# Patient Record
Sex: FEMALE | Race: OTHER | Employment: UNEMPLOYED | URBAN - METROPOLITAN AREA
[De-identification: names, ages, dates, MRNs, and addresses within clinical notes are randomized per-mention and may not be internally consistent; named-entity substitution may affect disease eponyms.]

---

## 2017-07-22 ENCOUNTER — APPOINTMENT (OUTPATIENT)
Dept: GENERAL RADIOLOGY | Age: 42
End: 2017-07-22
Payer: MEDICARE

## 2017-07-22 ENCOUNTER — HOSPITAL ENCOUNTER (EMERGENCY)
Age: 42
Discharge: HOME | End: 2017-07-22
Attending: EMERGENCY MEDICINE
Payer: MEDICARE

## 2017-07-22 VITALS
HEART RATE: 84 BPM | HEIGHT: 62 IN | TEMPERATURE: 98.1 F | BODY MASS INDEX: 49.69 KG/M2 | SYSTOLIC BLOOD PRESSURE: 156 MMHG | WEIGHT: 270 LBS | RESPIRATION RATE: 18 BRPM | DIASTOLIC BLOOD PRESSURE: 78 MMHG | OXYGEN SATURATION: 98 %

## 2017-07-22 DIAGNOSIS — S93.411A SPRAIN OF CALCANEOFIBULAR LIGAMENT OF RIGHT ANKLE, INITIAL ENCOUNTER: Primary | ICD-10-CM

## 2017-07-22 PROCEDURE — 73610 X-RAY EXAM OF ANKLE: CPT

## 2017-07-22 PROCEDURE — 73630 X-RAY EXAM OF FOOT: CPT

## 2017-07-22 PROCEDURE — 99283 EMERGENCY DEPT VISIT LOW MDM: CPT

## 2017-07-22 PROCEDURE — 6370000000 HC RX 637 (ALT 250 FOR IP): Performed by: EMERGENCY MEDICINE

## 2017-07-22 RX ORDER — HYDROCODONE BITARTRATE AND ACETAMINOPHEN 5; 325 MG/1; MG/1
1 TABLET ORAL EVERY 6 HOURS PRN
Qty: 12 TABLET | Refills: 0 | Status: SHIPPED | OUTPATIENT
Start: 2017-07-22

## 2017-07-22 RX ORDER — IBUPROFEN 800 MG/1
800 TABLET ORAL EVERY 8 HOURS PRN
Qty: 30 TABLET | Refills: 0 | Status: SHIPPED | OUTPATIENT
Start: 2017-07-22

## 2017-07-22 RX ORDER — IBUPROFEN 400 MG/1
800 TABLET ORAL ONCE
Status: COMPLETED | OUTPATIENT
Start: 2017-07-22 | End: 2017-07-22

## 2017-07-22 RX ORDER — HYDROCODONE BITARTRATE AND ACETAMINOPHEN 5; 325 MG/1; MG/1
1 TABLET ORAL ONCE
Status: COMPLETED | OUTPATIENT
Start: 2017-07-22 | End: 2017-07-22

## 2017-07-22 RX ORDER — LEVOTHYROXINE SODIUM 0.05 MG/1
50 TABLET ORAL DAILY
COMMUNITY

## 2017-07-22 RX ORDER — HYDROXYCHLOROQUINE SULFATE 200 MG/1
200 TABLET, FILM COATED ORAL 2 TIMES DAILY
COMMUNITY

## 2017-07-22 RX ADMIN — IBUPROFEN 800 MG: 400 TABLET ORAL at 17:26

## 2017-07-22 RX ADMIN — HYDROCODONE BITARTRATE AND ACETAMINOPHEN 1 TABLET: 5; 325 TABLET ORAL at 17:26

## 2017-07-22 ASSESSMENT — ENCOUNTER SYMPTOMS
COUGH: 0
VOMITING: 0
STRIDOR: 0
EYE DISCHARGE: 0
CHOKING: 0
TROUBLE SWALLOWING: 0
VOICE CHANGE: 0
DIARRHEA: 0
BLOOD IN STOOL: 0
EYE REDNESS: 0
SINUS PRESSURE: 0
SHORTNESS OF BREATH: 0
WHEEZING: 0
FACIAL SWELLING: 0
SORE THROAT: 0
ABDOMINAL PAIN: 0
CHEST TIGHTNESS: 0
EYE PAIN: 0
CONSTIPATION: 0
BACK PAIN: 0

## 2017-07-22 ASSESSMENT — PAIN DESCRIPTION - DESCRIPTORS: DESCRIPTORS: ACHING

## 2017-07-22 ASSESSMENT — PAIN SCALES - GENERAL
PAINLEVEL_OUTOF10: 6
PAINLEVEL_OUTOF10: 7
PAINLEVEL_OUTOF10: 6

## 2017-07-22 ASSESSMENT — PAIN DESCRIPTION - ORIENTATION: ORIENTATION: RIGHT

## 2017-07-22 ASSESSMENT — PAIN DESCRIPTION - PAIN TYPE: TYPE: ACUTE PAIN

## 2017-07-22 ASSESSMENT — PAIN DESCRIPTION - LOCATION: LOCATION: ANKLE

## 2021-12-23 ENCOUNTER — APPOINTMENT (OUTPATIENT)
Dept: GENERAL RADIOLOGY | Age: 46
End: 2021-12-23
Payer: COMMERCIAL

## 2021-12-23 ENCOUNTER — HOSPITAL ENCOUNTER (EMERGENCY)
Age: 46
Discharge: HOME | End: 2021-12-23
Attending: EMERGENCY MEDICINE
Payer: COMMERCIAL

## 2021-12-23 VITALS
WEIGHT: 250 LBS | HEIGHT: 62 IN | HEART RATE: 86 BPM | BODY MASS INDEX: 46.01 KG/M2 | OXYGEN SATURATION: 99 % | TEMPERATURE: 97.4 F | DIASTOLIC BLOOD PRESSURE: 85 MMHG | RESPIRATION RATE: 18 BRPM | SYSTOLIC BLOOD PRESSURE: 140 MMHG

## 2021-12-23 DIAGNOSIS — Z01.84 COVID-19 VIRUS IGG ANTIBODY TEST RESULT UNKNOWN: Primary | ICD-10-CM

## 2021-12-23 LAB
ANION GAP SERPL CALCULATED.3IONS-SCNC: 12 MEQ/L (ref 9–15)
BASOPHILS ABSOLUTE: 0.1 K/UL (ref 0–0.1)
BASOPHILS RELATIVE PERCENT: 0.7 % (ref 0.1–1.2)
BUN BLDV-MCNC: 7 MG/DL (ref 6–20)
CALCIUM SERPL-MCNC: 9.2 MG/DL (ref 8.5–9.9)
CHLORIDE BLD-SCNC: 103 MEQ/L (ref 95–107)
CO2: 25 MEQ/L (ref 20–31)
CREAT SERPL-MCNC: 0.62 MG/DL (ref 0.5–0.9)
EKG ATRIAL RATE: 80 BPM
EKG P AXIS: 40 DEGREES
EKG P-R INTERVAL: 164 MS
EKG Q-T INTERVAL: 380 MS
EKG QRS DURATION: 92 MS
EKG QTC CALCULATION (BAZETT): 438 MS
EKG R AXIS: -13 DEGREES
EKG T AXIS: 36 DEGREES
EKG VENTRICULAR RATE: 80 BPM
EOSINOPHILS ABSOLUTE: 0.2 K/UL (ref 0–0.4)
EOSINOPHILS RELATIVE PERCENT: 2.8 % (ref 0.7–5.8)
GFR AFRICAN AMERICAN: >60
GFR NON-AFRICAN AMERICAN: >60
GLUCOSE BLD-MCNC: 104 MG/DL (ref 70–99)
HCT VFR BLD CALC: 36.6 % (ref 37–47)
HEMOGLOBIN: 12.1 G/DL (ref 11.2–15.7)
IMMATURE GRANULOCYTES #: 0 K/UL
IMMATURE GRANULOCYTES %: 0.2 %
LYMPHOCYTES ABSOLUTE: 2.2 K/UL (ref 1.2–3.7)
LYMPHOCYTES RELATIVE PERCENT: 26.7 %
MCH RBC QN AUTO: 29.2 PG (ref 25.6–32.2)
MCHC RBC AUTO-ENTMCNC: 33.1 % (ref 32.2–35.5)
MCV RBC AUTO: 88.2 FL (ref 79.4–94.8)
MONOCYTES ABSOLUTE: 0.6 K/UL (ref 0.2–0.9)
MONOCYTES RELATIVE PERCENT: 7.4 % (ref 4.7–12.5)
NEUTROPHILS ABSOLUTE: 5 K/UL (ref 1.6–6.1)
NEUTROPHILS RELATIVE PERCENT: 62.2 % (ref 34–71.1)
PDW BLD-RTO: 12.3 % (ref 11.7–14.4)
PLATELET # BLD: 310 K/UL (ref 182–369)
POTASSIUM SERPL-SCNC: 3.8 MEQ/L (ref 3.4–4.9)
RBC # BLD: 4.15 M/UL (ref 3.93–5.22)
SODIUM BLD-SCNC: 140 MEQ/L (ref 135–144)
WBC # BLD: 8.1 K/UL (ref 4–10)

## 2021-12-23 PROCEDURE — 80048 BASIC METABOLIC PNL TOTAL CA: CPT

## 2021-12-23 PROCEDURE — 93005 ELECTROCARDIOGRAM TRACING: CPT

## 2021-12-23 PROCEDURE — 93010 ELECTROCARDIOGRAM REPORT: CPT | Performed by: INTERNAL MEDICINE

## 2021-12-23 PROCEDURE — 99284 EMERGENCY DEPT VISIT MOD MDM: CPT

## 2021-12-23 PROCEDURE — 6370000000 HC RX 637 (ALT 250 FOR IP): Performed by: EMERGENCY MEDICINE

## 2021-12-23 PROCEDURE — 36415 COLL VENOUS BLD VENIPUNCTURE: CPT

## 2021-12-23 PROCEDURE — 85025 COMPLETE CBC W/AUTO DIFF WBC: CPT

## 2021-12-23 PROCEDURE — 71046 X-RAY EXAM CHEST 2 VIEWS: CPT

## 2021-12-23 RX ORDER — DIPHENHYDRAMINE HCL 25 MG
25 TABLET ORAL
Status: COMPLETED | OUTPATIENT
Start: 2021-12-23 | End: 2021-12-23

## 2021-12-23 RX ORDER — OXYCODONE HYDROCHLORIDE AND ACETAMINOPHEN 5; 325 MG/1; MG/1
1 TABLET ORAL ONCE
Status: COMPLETED | OUTPATIENT
Start: 2021-12-23 | End: 2021-12-23

## 2021-12-23 RX ORDER — ONDANSETRON 4 MG/1
4 TABLET, ORALLY DISINTEGRATING ORAL ONCE
Status: COMPLETED | OUTPATIENT
Start: 2021-12-23 | End: 2021-12-23

## 2021-12-23 RX ORDER — HYDRALAZINE HYDROCHLORIDE 10 MG/1
10 TABLET, FILM COATED ORAL EVERY 8 HOURS SCHEDULED
Status: DISCONTINUED | OUTPATIENT
Start: 2021-12-23 | End: 2021-12-23 | Stop reason: HOSPADM

## 2021-12-23 RX ORDER — ONDANSETRON 2 MG/ML
4 INJECTION INTRAMUSCULAR; INTRAVENOUS ONCE
Status: DISCONTINUED | OUTPATIENT
Start: 2021-12-23 | End: 2021-12-23

## 2021-12-23 RX ORDER — DIPHENHYDRAMINE HYDROCHLORIDE 50 MG/ML
50 INJECTION INTRAMUSCULAR; INTRAVENOUS ONCE
Status: DISCONTINUED | OUTPATIENT
Start: 2021-12-23 | End: 2021-12-23

## 2021-12-23 RX ORDER — 0.9 % SODIUM CHLORIDE 0.9 %
1000 INTRAVENOUS SOLUTION INTRAVENOUS ONCE
Status: DISCONTINUED | OUTPATIENT
Start: 2021-12-23 | End: 2021-12-23

## 2021-12-23 RX ORDER — HYDRALAZINE HYDROCHLORIDE 20 MG/ML
5 INJECTION INTRAMUSCULAR; INTRAVENOUS ONCE
Status: DISCONTINUED | OUTPATIENT
Start: 2021-12-23 | End: 2021-12-23

## 2021-12-23 RX ADMIN — OXYCODONE HYDROCHLORIDE AND ACETAMINOPHEN 1 TABLET: 5; 325 TABLET ORAL at 05:21

## 2021-12-23 RX ADMIN — DIPHENHYDRAMINE HCL 25 MG: 25 TABLET ORAL at 05:21

## 2021-12-23 RX ADMIN — HYDRALAZINE HYDROCHLORIDE 10 MG: 10 TABLET, FILM COATED ORAL at 05:21

## 2021-12-23 RX ADMIN — ONDANSETRON 4 MG: 4 TABLET, ORALLY DISINTEGRATING ORAL at 05:21

## 2021-12-23 ASSESSMENT — PAIN SCALES - GENERAL: PAINLEVEL_OUTOF10: 8

## 2021-12-23 ASSESSMENT — PAIN DESCRIPTION - LOCATION: LOCATION: HEAD

## 2021-12-23 NOTE — ED TRIAGE NOTES
Pt. Presents to ED with complaints of hypertension, headache, and left side chest pain that started yesterday. Home blood pressure machine reading 180/100's.

## 2021-12-23 NOTE — ED PROVIDER NOTES
[address] [place] [address] ED  EMERGENCY DEPARTMENT ENCOUNTER      Pt Name: Madonna Miranda  MRN: [id number]  [place] 1975  Date of evaluation: 12/23/2021  Provider: Patricia Atkinson DO        HISTORY OF PRESENT ILLNESS    Madonna Miranda is a 55 y.o. female per chart review has ah/o arthritis and hypothyroid. Presents with headache. The history is provided by the patient. URI  Presenting symptoms: congestion, cough and fatigue    Presenting symptoms: no ear pain, no fever and no sore throat    Severity:  Moderate  Onset quality:  Sudden  Timing:  Constant  Progression:  Unchanged  Chronicity:  New  Relieved by:  Nothing  Worsened by:  Nothing  Ineffective treatments:  None tried  Associated symptoms: myalgias    Associated symptoms: no neck pain    Risk factors: recent illness and sick contacts    Risk factors: no chronic cardiac disease, no chronic kidney disease and no chronic respiratory disease             REVIEW OF SYSTEMS       Review of Systems   Constitutional: Positive for fatigue. Negative for chills and fever. HENT: Positive for congestion. Negative for ear pain and sore throat. Eyes: Negative for discharge and redness. Respiratory: Positive for cough. Negative for shortness of breath. Cardiovascular: Negative for chest pain and palpitations. Gastrointestinal: Negative for abdominal pain, nausea and vomiting. Genitourinary: Negative for difficulty urinating and dysuria. Musculoskeletal: Positive for myalgias. Negative for back pain and neck pain. Skin: Negative for rash and wound. Neurological: Negative for dizziness and syncope. Psychiatric/Behavioral: Negative for confusion. The patient is not nervous/anxious. All other systems reviewed and are negative. Except as noted above the remainder of the review of systems was reviewed and negative.        PAST MEDICAL HISTORY     Past Medical History:   Diagnosis Date    Arthritis     Hypothyroid          SURGICAL HISTORY Past Surgical History:   Procedure Laterality Date    TUBAL LIGATION      WISDOM TOOTH EXTRACTION           CURRENT MEDICATIONS       Discharge Medication List as of 12/23/2021  6:49 AM      CONTINUE these medications which have NOT CHANGED    Details   levothyroxine (SYNTHROID) 50 MCG tablet Take 50 mcg by mouth DailyHistorical Med      hydroxychloroquine (PLAQUENIL) 200 MG tablet Take 200 mg by mouth 2 times dailyHistorical Med      HYDROcodone-acetaminophen (NORCO) 5-325 MG per tablet Take 1 tablet by mouth every 6 hours as needed for Pain ., Disp-12 tablet, R-0Print      ibuprofen (ADVIL;MOTRIN) 800 MG tablet Take 1 tablet by mouth every 8 hours as needed for Pain, Disp-30 tablet, R-0Print             ALLERGIES     Pcn [penicillins]    FAMILY HISTORY     History reviewed. No pertinent family history. SOCIAL HISTORY       Social History     Socioeconomic History    Marital status:      Spouse name: None    Number of children: None    Years of education: None    Highest education level: None   Occupational History    None   Tobacco Use    Smoking status: Never Smoker    Smokeless tobacco: Never Used   Substance and Sexual Activity    Alcohol use: No    Drug use: No    Sexual activity: None   Other Topics Concern    None   Social History Narrative    None     Social Determinants of Health     Financial Resource Strain:     Difficulty of Paying Living Expenses: Not on file   Food Insecurity:     Worried About Running Out of Food in the Last Year: Not on file    Luis of Food in the Last Year: Not on file   Transportation Needs:     Lack of Transportation (Medical): Not on file    Lack of Transportation (Non-Medical):  Not on file   Physical Activity:     Days of Exercise per Week: Not on file    Minutes of Exercise per Session: Not on file   Stress:     Feeling of Stress : Not on file   Social Connections:     Frequency of Communication with Friends and Family: Not on file warm.      Capillary Refill: Capillary refill takes less than 2 seconds. Neurological:      General: No focal deficit present. Mental Status: She is alert and oriented to person, place, and time. Deep Tendon Reflexes: Reflexes are normal and symmetric. Psychiatric:         Attention and Perception: Attention and perception normal.         Mood and Affect: Mood normal.         Behavior: Behavior normal. Behavior is cooperative. LABS:  Labs Reviewed   BASIC METABOLIC PANEL - Abnormal; Notable for the following components:       Result Value    Glucose 104 (*)     All other components within normal limits   CBC WITH AUTO DIFFERENTIAL - Abnormal; Notable for the following components:    Hematocrit 36.6 (*)     All other components within normal limits   URINE RT REFLEX TO CULTURE         MDM:   Vitals:    Vitals:    12/23/21 0407 12/23/21 0525 12/23/21 0615 12/23/21 0652   BP: (!) 180/117 (!) 151/108 (!) 131/93 (!) 140/85   Pulse: 88 84 86 86   Resp: 18 18 18 18   Temp: 97.4 °F (36.3 °C)      TempSrc: Temporal      SpO2: 100% 100% 99% 99%   Weight: 250 lb (113.4 kg)      Height: 5' 2\" (1.575 m)          MDM  Number of Diagnoses or Management Options  COVID-19 virus IgG antibody test result unknown  Diagnosis management comments: Patient presents with URI symptoms. Her headache was treated. She was given percocet 325/5 1 tab PO, zofran 4mg ODT, benadryl 25mg PO.  CXR: no acute changes  She feels much better and will be discharged home. She will follow up in 2 days with her primary care doctor. Blayne Hogan DO     The lab results, radiology and test results were reviewed with the patient and family. The patient will follow up in 2 days with their primary care doctor. If their symptoms change or get worse they will return to the ER. CRITICAL CARE TIME   Total CriticalCare time was 0 minutes, excluding separately reportable procedures.   There was a high probability of clinically significant/life threatening deterioration in the patient's condition which required my urgent intervention.         PROCEDURES:  Unlessotherwise noted below, none     Procedures      FINAL IMPRESSION      1. COVID-19 virus IgG antibody test result unknown          DISPOSITION/PLAN   DISPOSITION Decision To Discharge 12/23/2021 06:43:32 AM          RICH Pena DO (electronically signed)  Attending Emergency Physician          Neno Davison DO  12/26/21 3681

## 2021-12-23 NOTE — ED NOTES
Dr. Tasha Shaw notified of patient refusing IV because she doesn't think she needs it. Stated she is ok with the blood work but would like the medications oral.  Verbal orders placed per Dr. Tasha Shaw.        Jane Manning RN  12/23/21 3975

## 2021-12-26 ASSESSMENT — ENCOUNTER SYMPTOMS
SHORTNESS OF BREATH: 0
COUGH: 1
EYE DISCHARGE: 0
ABDOMINAL PAIN: 0
SORE THROAT: 0
BACK PAIN: 0
VOMITING: 0
EYE REDNESS: 0
NAUSEA: 0

## 2023-02-04 ENCOUNTER — HOSPITAL ENCOUNTER (EMERGENCY)
Age: 48
Discharge: HOME | End: 2023-02-04
Attending: EMERGENCY MEDICINE
Payer: COMMERCIAL

## 2023-02-04 VITALS
BODY MASS INDEX: 47.84 KG/M2 | DIASTOLIC BLOOD PRESSURE: 88 MMHG | SYSTOLIC BLOOD PRESSURE: 146 MMHG | HEIGHT: 62 IN | RESPIRATION RATE: 18 BRPM | WEIGHT: 260 LBS | TEMPERATURE: 98 F | OXYGEN SATURATION: 98 %

## 2023-02-04 DIAGNOSIS — L08.9 FINGER INFECTION: Primary | ICD-10-CM

## 2023-02-04 PROCEDURE — 99283 EMERGENCY DEPT VISIT LOW MDM: CPT

## 2023-02-04 PROCEDURE — 6370000000 HC RX 637 (ALT 250 FOR IP): Performed by: EMERGENCY MEDICINE

## 2023-02-04 RX ORDER — CLINDAMYCIN HYDROCHLORIDE 300 MG/1
300 CAPSULE ORAL 3 TIMES DAILY
Qty: 15 CAPSULE | Refills: 0 | Status: SHIPPED | OUTPATIENT
Start: 2023-02-04 | End: 2023-02-09

## 2023-02-04 RX ORDER — IBUPROFEN 400 MG/1
400 TABLET ORAL ONCE
Status: COMPLETED | OUTPATIENT
Start: 2023-02-04 | End: 2023-02-04

## 2023-02-04 RX ORDER — CLINDAMYCIN HYDROCHLORIDE 150 MG/1
300 CAPSULE ORAL ONCE
Status: COMPLETED | OUTPATIENT
Start: 2023-02-04 | End: 2023-02-04

## 2023-02-04 RX ADMIN — CLINDAMYCIN HYDROCHLORIDE 300 MG: 150 CAPSULE ORAL at 22:58

## 2023-02-04 RX ADMIN — IBUPROFEN 400 MG: 400 TABLET ORAL at 22:58

## 2023-02-04 ASSESSMENT — PAIN DESCRIPTION - ONSET
ONSET: ON-GOING
ONSET: ON-GOING

## 2023-02-04 ASSESSMENT — PAIN DESCRIPTION - DESCRIPTORS
DESCRIPTORS: ACHING
DESCRIPTORS: SHARP

## 2023-02-04 ASSESSMENT — PAIN - FUNCTIONAL ASSESSMENT
PAIN_FUNCTIONAL_ASSESSMENT: 0-10
PAIN_FUNCTIONAL_ASSESSMENT: 0-10

## 2023-02-04 ASSESSMENT — PAIN DESCRIPTION - PAIN TYPE
TYPE: ACUTE PAIN
TYPE: ACUTE PAIN

## 2023-02-04 ASSESSMENT — PAIN DESCRIPTION - FREQUENCY
FREQUENCY: CONTINUOUS
FREQUENCY: CONTINUOUS

## 2023-02-04 ASSESSMENT — PAIN DESCRIPTION - LOCATION
LOCATION: FINGER (COMMENT WHICH ONE)
LOCATION: FINGER (COMMENT WHICH ONE)

## 2023-02-04 ASSESSMENT — PAIN SCALES - GENERAL
PAINLEVEL_OUTOF10: 4
PAINLEVEL_OUTOF10: 5

## 2023-02-04 ASSESSMENT — PAIN DESCRIPTION - ORIENTATION: ORIENTATION: RIGHT

## 2023-02-05 NOTE — ED NOTES
Nursing Adult Assessment    General Appearance  [x] Facial Expressions, extremities, & body posture are relaxed. [] Exceptions:    Cognitive  [x] Alert, make eye contact when prompted. [x] Oriented to person, place, & situation. [] Exceptions:    Respiratory  [x] Unlabored breathing   [x] Speaks in clear and complete sentences   [x] Chest expansion is symmetrical with breaths   [x] Breath sounds are clear bilaterally. [] Exceptions:    Cardiovascular  [x] Regular apical heart sounds   [x] Peripheral pulses are palpable   [x] Capillary refill < 3 seconds in all extremities. [] Exceptions:    Abdomen  [x] Non-tender   [x] Non-distended   [x] Bowel sounds x4 quadrants.  [] Exceptions:    Skin  [x] Color appropriate for ethnicity   [x] No rash or discoloration present at the area(s) of complaint   [x] Warm and dry to touch. [x] Exceptions: Pt with swelling right thumb and no drainage. [x] Non-tender   [x] Normal range of motion   [x] Normal sensation   [x] Normal Appearance, No Edema.   [] Exceptions:     Gunnar Jensen RN  02/04/23 9477

## 2023-02-05 NOTE — ED PROVIDER NOTES
[facility] ED  EMERGENCY DEPARTMENT ENCOUNTER      Pt Name: Camden Harrell  MRN: [id number]  [place] 1975  Date of evaluation: 2/4/2023  Provider: Li Ramesh DO    CHIEF COMPLAINT       Chief Complaint   Patient presents with    Finger Pain     Right thumb     Chief complaint: Right thumb pain swelling    History of chief complaint: This 49-year-old female presents the emergency department complaining of noticing a red swollen area on her right thumb today and concern for infection. States she was picking some dead skin off of the base of her right thumb cuticle the other day and thinks infection may have gotten in. Patient denies any injury or trauma to the area. Patient states it is tender denies any numb tingling or weakness to the digit. Patient reports old nail trauma not new or different. She denies any fevers chills nausea vomiting weak or dizzy feelings she has otherwise been well is not a diabetic. Nursing Notes were reviewed. REVIEW OF SYSTEMS    (2-9 systems for level 4, 10 or more for level 5)     Review of Systems  Pertinent findings documented in the history of present illness  Except as noted above the remainder of the review of systems was reviewed and negative. PAST MEDICAL HISTORY     Past Medical History:   Diagnosis Date    Arthritis     Hypertension     Hypothyroid          SURGICAL HISTORY       Past Surgical History:   Procedure Laterality Date    TUBAL LIGATION      WISDOM TOOTH EXTRACTION           CURRENT MEDICATIONS       Previous Medications    HYDROCODONE-ACETAMINOPHEN (NORCO) 5-325 MG PER TABLET    Take 1 tablet by mouth every 6 hours as needed for Pain .     HYDROXYCHLOROQUINE (PLAQUENIL) 200 MG TABLET    Take 200 mg by mouth 2 times daily    IBUPROFEN (ADVIL;MOTRIN) 800 MG TABLET    Take 1 tablet by mouth every 8 hours as needed for Pain    LEVOTHYROXINE (SYNTHROID) 50 MCG TABLET    Take 50 mcg by mouth Daily       ALLERGIES     Pcn [penicillins]    FAMILY HISTORY     History reviewed. No pertinent family history. SOCIAL HISTORY       Social History     Socioeconomic History    Marital status:      Spouse name: None    Number of children: None    Years of education: None    Highest education level: None   Tobacco Use    Smoking status: Never    Smokeless tobacco: Never   Substance and Sexual Activity    Alcohol use: No    Drug use: No    Sexual activity: Yes     Partners: Male           PHYSICAL EXAM    (up to 7 for level 4, 8 or more for level 5)     ED Triage Vitals [02/04/23 2246]   BP Temp Temp Source Pulse Resp SpO2 Height Weight   (!) 146/88 98 °F (36.7 °C) Oral -- 18 98 % 5' 2\" (1.575 m) 260 lb (117.9 kg)       Physical Exam  General appearance: Patient is awake alert interactive appropriate nontoxic in no distress  Heart is regular rate and rhythm  Lungs are clear to auscultation with equal breaths bilaterally. Right thumb: There is a 1 cm x 8 cm oval area of focal swelling and dull erythema along the outer aspect of the distal thumb, there is no gross erythema or swelling around the nailbed or overlying the pad of the finger. No associated warmth. There is no lymphangitis. No fluctuance. Capillary refill is less than 2 seconds. There are chronic traumatic changes noted to the fingernail range of motion is full and intact to the interphalangeal and MP joint    DIAGNOSTIC RESULTS       EMERGENCY DEPARTMENT COURSE and DIFFERENTIAL DIAGNOSIS/MDM:   Vitals:    Vitals:    02/04/23 2246   BP: (!) 146/88   Resp: 18   Temp: 98 °F (36.7 °C)   TempSrc: Oral   SpO2: 98%   Weight: 260 lb (117.9 kg)   Height: 5' 2\" (1.575 m)     Treatment and course: Clindamycin 300 mg p.o. was initiated here along with Motrin 400 mg p.o. FINAL IMPRESSION      1.  Finger infection          DISPOSITION/PLAN   DISPOSITION Discharge - Pending Orders Complete 02/04/2023 11:01:03 PM  Patient discharged home advised warm moist compresses soaks to the thumb home-going prescription for clindamycin was written for 5 days. Patient advised to monitor closely return if any change or worsening increasing pain swelling redness numb tingling or weakness. Patient to follow-up with primary physician for repeat assessment in the next 2-3 days    PATIENT REFERRED TO:  DO Taryn Amato [address] [place] [address]  [phone]    In 2 days      DISCHARGE MEDICATIONS:  New Prescriptions    CLINDAMYCIN (CLEOCIN) 300 MG CAPSULE    Take 1 capsule by mouth 3 times daily for 5 days     Controlled Substances Monitoring:     No flowsheet data found.     (Please note that portions of this note were completed with a voice recognition program.  Efforts were made to edit the dictations but occasionally words are mis-transcribed.)    Olena Bowman DO (electronically signed)  Attending Emergency Physician          Olena Bowman DO  02/04/23 8603

## 2023-03-16 DIAGNOSIS — I10 HYPERTENSION, UNSPECIFIED TYPE: Primary | ICD-10-CM

## 2023-03-16 DIAGNOSIS — I10 PRIMARY HYPERTENSION: Primary | ICD-10-CM

## 2023-03-16 RX ORDER — HYDROCHLOROTHIAZIDE 12.5 MG/1
12.5 CAPSULE ORAL DAILY
Qty: 30 CAPSULE | Refills: 0 | Status: CANCELLED | OUTPATIENT
Start: 2023-03-16 | End: 2024-03-15

## 2023-03-16 RX ORDER — PANTOPRAZOLE SODIUM 40 MG/1
40 TABLET, DELAYED RELEASE ORAL
COMMUNITY
End: 2024-04-09 | Stop reason: ALTCHOICE

## 2023-03-16 RX ORDER — HYDROCHLOROTHIAZIDE 12.5 MG/1
12.5 CAPSULE ORAL DAILY
COMMUNITY
End: 2023-03-16 | Stop reason: SDUPTHER

## 2023-03-16 RX ORDER — AMLODIPINE BESYLATE 5 MG/1
5 TABLET ORAL DAILY
Qty: 30 TABLET | Refills: 5 | Status: SHIPPED | OUTPATIENT
Start: 2023-03-16 | End: 2023-10-26 | Stop reason: SDUPTHER

## 2023-03-16 RX ORDER — AMLODIPINE BESYLATE 5 MG/1
5 TABLET ORAL DAILY
COMMUNITY
End: 2023-03-16 | Stop reason: SDUPTHER

## 2023-03-16 RX ORDER — LEVOTHYROXINE SODIUM 50 UG/1
50 TABLET ORAL DAILY
COMMUNITY
End: 2023-12-15 | Stop reason: SDUPTHER

## 2023-03-16 RX ORDER — AMLODIPINE BESYLATE 5 MG/1
5 TABLET ORAL DAILY
Qty: 90 TABLET | Refills: 0 | Status: CANCELLED | OUTPATIENT
Start: 2023-03-16 | End: 2024-03-15

## 2023-03-16 RX ORDER — FLUTICASONE PROPIONATE 50 MCG
2 SPRAY, SUSPENSION (ML) NASAL DAILY
COMMUNITY
Start: 2022-05-11 | End: 2023-12-26 | Stop reason: WASHOUT

## 2023-03-16 RX ORDER — HYDROCHLOROTHIAZIDE 12.5 MG/1
12.5 CAPSULE ORAL DAILY
Qty: 30 CAPSULE | Refills: 5 | Status: SHIPPED | OUTPATIENT
Start: 2023-03-16 | End: 2023-12-28 | Stop reason: SDUPTHER

## 2023-03-27 NOTE — TELEPHONE ENCOUNTER
Called and left message to call back and schedule appointment  
Left DVM to call and schedule appt  
Left another message to call and schedule appointment. I will send a letter  
Detail Level: Detailed
Hide Additional Notes?: No
Detail Level: Simple

## 2023-05-23 ENCOUNTER — OFFICE VISIT (OUTPATIENT)
Dept: PRIMARY CARE | Facility: CLINIC | Age: 48
End: 2023-05-23
Payer: COMMERCIAL

## 2023-05-23 VITALS
HEIGHT: 62 IN | HEART RATE: 77 BPM | SYSTOLIC BLOOD PRESSURE: 136 MMHG | RESPIRATION RATE: 14 BRPM | OXYGEN SATURATION: 98 % | TEMPERATURE: 97.1 F | WEIGHT: 266 LBS | BODY MASS INDEX: 48.95 KG/M2 | DIASTOLIC BLOOD PRESSURE: 80 MMHG

## 2023-05-23 DIAGNOSIS — J45.20 MILD INTERMITTENT REACTIVE AIRWAY DISEASE WITHOUT COMPLICATION (HHS-HCC): ICD-10-CM

## 2023-05-23 DIAGNOSIS — J32.9 SINUSITIS, UNSPECIFIED CHRONICITY, UNSPECIFIED LOCATION: Primary | ICD-10-CM

## 2023-05-23 PROBLEM — R09.A2 GLOBUS SENSATION: Status: ACTIVE | Noted: 2023-05-23

## 2023-05-23 PROBLEM — R11.0 NAUSEA IN ADULT: Status: ACTIVE | Noted: 2023-05-23

## 2023-05-23 PROBLEM — N76.0 BACTERIAL VAGINOSIS: Status: ACTIVE | Noted: 2023-05-23

## 2023-05-23 PROBLEM — R05.9 COUGH: Status: ACTIVE | Noted: 2023-05-23

## 2023-05-23 PROBLEM — G47.30 SLEEP APNEA: Status: ACTIVE | Noted: 2023-05-23

## 2023-05-23 PROBLEM — B96.89 BACTERIAL VAGINOSIS: Status: ACTIVE | Noted: 2023-05-23

## 2023-05-23 PROBLEM — R10.84 GENERALIZED ABDOMINAL PAIN: Status: ACTIVE | Noted: 2023-05-23

## 2023-05-23 PROBLEM — L60.9 DISORDER OF NAIL: Status: ACTIVE | Noted: 2023-05-23

## 2023-05-23 PROBLEM — R00.2 PALPITATION: Status: ACTIVE | Noted: 2023-05-23

## 2023-05-23 PROBLEM — R10.9 LEFT FLANK PAIN: Status: ACTIVE | Noted: 2023-05-23

## 2023-05-23 PROBLEM — I10 HTN (HYPERTENSION): Status: ACTIVE | Noted: 2023-05-23

## 2023-05-23 PROBLEM — J30.2 SEASONAL ALLERGIES: Status: ACTIVE | Noted: 2023-05-23

## 2023-05-23 PROBLEM — D22.9 ATYPICAL MOLE: Status: ACTIVE | Noted: 2023-05-23

## 2023-05-23 PROBLEM — D64.9 ABSOLUTE ANEMIA: Status: ACTIVE | Noted: 2023-05-23

## 2023-05-23 PROBLEM — J06.9 URI (UPPER RESPIRATORY INFECTION): Status: ACTIVE | Noted: 2023-05-23

## 2023-05-23 PROBLEM — G43.909 MIGRAINE: Status: ACTIVE | Noted: 2023-05-23

## 2023-05-23 PROBLEM — M54.50 LOWER BACK PAIN: Status: ACTIVE | Noted: 2023-05-23

## 2023-05-23 PROBLEM — E06.3 HASHIMOTO'S THYROIDITIS: Status: ACTIVE | Noted: 2023-05-23

## 2023-05-23 PROBLEM — J30.9 ALLERGIC SINUSITIS: Status: ACTIVE | Noted: 2023-05-23

## 2023-05-23 PROBLEM — R31.9 HEMATURIA: Status: ACTIVE | Noted: 2023-05-23

## 2023-05-23 PROBLEM — K21.9 GASTROESOPHAGEAL REFLUX DISEASE: Status: ACTIVE | Noted: 2023-05-23

## 2023-05-23 PROBLEM — E03.9 HYPOTHYROID: Status: ACTIVE | Noted: 2023-05-23

## 2023-05-23 PROBLEM — M19.90 INFLAMMATORY ARTHROPATHY: Status: ACTIVE | Noted: 2023-05-23

## 2023-05-23 PROBLEM — K92.1 HEMATOCHEZIA: Status: ACTIVE | Noted: 2023-05-23

## 2023-05-23 PROBLEM — R74.8 ELEVATED LIVER ENZYMES: Status: ACTIVE | Noted: 2023-05-23

## 2023-05-23 PROCEDURE — 1036F TOBACCO NON-USER: CPT | Performed by: INTERNAL MEDICINE

## 2023-05-23 PROCEDURE — 99213 OFFICE O/P EST LOW 20 MIN: CPT | Performed by: INTERNAL MEDICINE

## 2023-05-23 PROCEDURE — 3079F DIAST BP 80-89 MM HG: CPT | Performed by: INTERNAL MEDICINE

## 2023-05-23 PROCEDURE — 3075F SYST BP GE 130 - 139MM HG: CPT | Performed by: INTERNAL MEDICINE

## 2023-05-23 PROCEDURE — 3008F BODY MASS INDEX DOCD: CPT | Performed by: INTERNAL MEDICINE

## 2023-05-23 RX ORDER — ALBUTEROL SULFATE 90 UG/1
2 AEROSOL, METERED RESPIRATORY (INHALATION) EVERY 4 HOURS PRN
Qty: 8.5 G | Refills: 1 | Status: SHIPPED | OUTPATIENT
Start: 2023-05-23 | End: 2023-07-20 | Stop reason: SDUPTHER

## 2023-05-23 RX ORDER — DOXYCYCLINE 100 MG/1
100 CAPSULE ORAL 2 TIMES DAILY
Qty: 20 CAPSULE | Refills: 0 | Status: SHIPPED | OUTPATIENT
Start: 2023-05-23 | End: 2023-06-02

## 2023-05-23 NOTE — PROGRESS NOTES
"Subjective    Eli Rockwell is a 48 y.o. female who presents for Cough.  HPI    C/o cough x 1 month. Post nasal drainage.   She had a cold about a month ago and now she it gets better and then it gets worse.   She has dry cough. Spastic. Keeps her up at night  No fever.     Review of Systems   All other systems reviewed and are negative.        Objective     /80 (BP Location: Left arm, Patient Position: Sitting, BP Cuff Size: Adult)   Pulse 77   Temp 36.2 °C (97.1 °F) (Skin)   Resp 14   Ht 1.575 m (5' 2\")   Wt 121 kg (266 lb)   SpO2 98%   BMI 48.65 kg/m²    Physical Exam  Vitals reviewed.   Constitutional:       General: She is not in acute distress.     Appearance: Normal appearance.   HENT:      Right Ear: Tympanic membrane normal.      Left Ear: Tympanic membrane normal.      Mouth/Throat:      Comments: pnd  Cardiovascular:      Rate and Rhythm: Normal rate and regular rhythm.      Pulses: Normal pulses.      Heart sounds: Normal heart sounds.   Pulmonary:      Effort: Pulmonary effort is normal.      Breath sounds: Normal breath sounds.   Abdominal:      Tenderness: There is no abdominal tenderness.   Musculoskeletal:         General: No swelling.      Cervical back: Normal range of motion and neck supple.   Lymphadenopathy:      Cervical: No cervical adenopathy.   Skin:     General: Skin is warm and dry.   Neurological:      Mental Status: She is alert.       Health Maintenance Due   Topic Date Due    Yearly Adult Physical  Never done    Hepatitis B Vaccines (1 of 3 - 3-dose series) Never done    HIV Screening  Never done    Colorectal Cancer Screening  Never done    COVID-19 Vaccine (1) Never done    MMR Vaccines (1 of 1 - Standard series) Never done    Diabetes Screening  Never done    Cervical Cancer Screening  Never done    DTaP/Tdap/Td Vaccines (1 - Tdap) Never done    Mammogram  Never done          Assessment/Plan   Problem List Items Addressed This Visit    None  Visit Diagnoses       " Sinusitis, unspecified chronicity, unspecified location    -  Primary    Relevant Medications    doxycycline (Vibramycin) 100 mg capsule    Mild intermittent reactive airway disease without complication        Relevant Medications    albuterol (ProAir HFA) 90 mcg/actuation inhaler        Increase fluids and rest. Call if sx worse or not improving. Follow up as needed  Take abx with food.   Side effects of albuterol discussed

## 2023-05-23 NOTE — PATIENT INSTRUCTIONS
Call  with any problems or questions.   Increase fluids and rest. Call if sx worse or not improving. Follow up as needed

## 2023-07-20 DIAGNOSIS — J45.20 MILD INTERMITTENT REACTIVE AIRWAY DISEASE WITHOUT COMPLICATION (HHS-HCC): ICD-10-CM

## 2023-07-20 RX ORDER — ALBUTEROL SULFATE 90 UG/1
2 AEROSOL, METERED RESPIRATORY (INHALATION) EVERY 4 HOURS PRN
Qty: 8.5 G | Refills: 2 | Status: SHIPPED | OUTPATIENT
Start: 2023-07-20 | End: 2024-04-09 | Stop reason: ALTCHOICE

## 2023-08-17 ENCOUNTER — OFFICE VISIT (OUTPATIENT)
Dept: PRIMARY CARE | Facility: CLINIC | Age: 48
End: 2023-08-17
Payer: COMMERCIAL

## 2023-08-17 ENCOUNTER — LAB (OUTPATIENT)
Dept: LAB | Facility: LAB | Age: 48
End: 2023-08-17
Payer: COMMERCIAL

## 2023-08-17 VITALS
SYSTOLIC BLOOD PRESSURE: 138 MMHG | TEMPERATURE: 97.2 F | WEIGHT: 260 LBS | HEART RATE: 86 BPM | OXYGEN SATURATION: 98 % | RESPIRATION RATE: 14 BRPM | DIASTOLIC BLOOD PRESSURE: 80 MMHG | HEIGHT: 62 IN | BODY MASS INDEX: 47.84 KG/M2

## 2023-08-17 DIAGNOSIS — R53.83 OTHER FATIGUE: ICD-10-CM

## 2023-08-17 DIAGNOSIS — Z12.31 ENCOUNTER FOR SCREENING MAMMOGRAM FOR MALIGNANT NEOPLASM OF BREAST: Primary | ICD-10-CM

## 2023-08-17 DIAGNOSIS — D64.9 ANEMIA, UNSPECIFIED TYPE: ICD-10-CM

## 2023-08-17 DIAGNOSIS — R00.2 PALPITATION: ICD-10-CM

## 2023-08-17 DIAGNOSIS — R06.02 SOB (SHORTNESS OF BREATH): ICD-10-CM

## 2023-08-17 LAB
ERYTHROCYTE DISTRIBUTION WIDTH (RATIO) BY AUTOMATED COUNT: 16.5 % (ref 11.5–14.5)
ERYTHROCYTE MEAN CORPUSCULAR HEMOGLOBIN CONCENTRATION (G/DL) BY AUTOMATED: 30.4 G/DL (ref 32–36)
ERYTHROCYTE MEAN CORPUSCULAR VOLUME (FL) BY AUTOMATED COUNT: 80 FL (ref 80–100)
ERYTHROCYTES (10*6/UL) IN BLOOD BY AUTOMATED COUNT: 4.55 X10E12/L (ref 4–5.2)
HEMATOCRIT (%) IN BLOOD BY AUTOMATED COUNT: 36.2 % (ref 36–46)
HEMOGLOBIN (G/DL) IN BLOOD: 11 G/DL (ref 12–16)
LEUKOCYTES (10*3/UL) IN BLOOD BY AUTOMATED COUNT: 7.7 X10E9/L (ref 4.4–11.3)
PLATELETS (10*3/UL) IN BLOOD AUTOMATED COUNT: 342 X10E9/L (ref 150–450)

## 2023-08-17 PROCEDURE — 82728 ASSAY OF FERRITIN: CPT

## 2023-08-17 PROCEDURE — 36415 COLL VENOUS BLD VENIPUNCTURE: CPT

## 2023-08-17 PROCEDURE — 84443 ASSAY THYROID STIM HORMONE: CPT

## 2023-08-17 PROCEDURE — 93000 ELECTROCARDIOGRAM COMPLETE: CPT | Performed by: INTERNAL MEDICINE

## 2023-08-17 PROCEDURE — 82607 VITAMIN B-12: CPT

## 2023-08-17 PROCEDURE — 1036F TOBACCO NON-USER: CPT | Performed by: INTERNAL MEDICINE

## 2023-08-17 PROCEDURE — 3075F SYST BP GE 130 - 139MM HG: CPT | Performed by: INTERNAL MEDICINE

## 2023-08-17 PROCEDURE — 3008F BODY MASS INDEX DOCD: CPT | Performed by: INTERNAL MEDICINE

## 2023-08-17 PROCEDURE — 3079F DIAST BP 80-89 MM HG: CPT | Performed by: INTERNAL MEDICINE

## 2023-08-17 PROCEDURE — 83540 ASSAY OF IRON: CPT

## 2023-08-17 PROCEDURE — 85027 COMPLETE CBC AUTOMATED: CPT

## 2023-08-17 PROCEDURE — 99214 OFFICE O/P EST MOD 30 MIN: CPT | Performed by: INTERNAL MEDICINE

## 2023-08-17 PROCEDURE — 83550 IRON BINDING TEST: CPT

## 2023-08-17 NOTE — PROGRESS NOTES
"Subjective    Eli Rockwell is a 48 y.o. female who presents for Hypertension.  HPI  Reports not feeling right last night. Felt heart palpitations.   Has had headaches and some lightheadedness in the last few days.   Decreased appetite, fatigue.   Has been going on for a month.   She is not sleeping well at night. Had her sleep study done last spring but she never got the results.   She has reguar diet and her periods are regular.   She is due for mammogram  She had colonoscopy in 2019/ repeat in 2019  Since she had covid two years ago she gets sob with exertion.   Her mood is soso    Review of Systems   All other systems reviewed and are negative.        Objective     /80 (BP Location: Left arm, Patient Position: Lying, BP Cuff Size: Adult)   Pulse 86   Temp 36.2 °C (97.2 °F) (Skin)   Resp 14   Ht 1.575 m (5' 2\")   Wt 118 kg (260 lb)   SpO2 98%   BMI 47.55 kg/m²    Physical Exam  Vitals reviewed.   Constitutional:       General: She is not in acute distress.     Appearance: Normal appearance.   Neck:      Thyroid: No thyroid mass or thyromegaly.   Cardiovascular:      Rate and Rhythm: Normal rate and regular rhythm.      Pulses: Normal pulses.      Heart sounds: Normal heart sounds.   Pulmonary:      Effort: Pulmonary effort is normal.      Breath sounds: Normal breath sounds.   Abdominal:      Tenderness: There is no abdominal tenderness.   Musculoskeletal:         General: No swelling.      Cervical back: Normal range of motion and neck supple.   Skin:     General: Skin is warm and dry.   Neurological:      Mental Status: She is alert.       Health Maintenance Due   Topic Date Due    Yearly Adult Physical  Never done    Hepatitis B Vaccines (1 of 3 - 3-dose series) Never done    HIV Screening  Never done    Colorectal Cancer Screening  Never done    COVID-19 Vaccine (1) Never done    MMR Vaccines (1 of 1 - Standard series) Never done    Diabetes Screening  Never done    DTaP/Tdap/Td Vaccines (1 - " Tdap) Never done    Mammogram  Never done    Cervical Cancer Screening  03/10/2023    TSH Level  08/19/2023          Assessment/Plan   Problem List Items Addressed This Visit       Absolute anemia    Relevant Orders    Vitamin B12    Iron and TIBC    Ferritin    CBC    Palpitation    Relevant Orders    Referral to Cardiology    Follow Up In [facility] - PCP - Established     Other Visit Diagnoses       Encounter for screening mammogram for malignant neoplasm of breast    -  Primary    Relevant Orders    BI mammo bilateral screening tomosynthesis    ECG 12 lead (Completed)    Other fatigue        Relevant Orders    Thyroid Stimulating Hormone    SOB (shortness of breath)        Relevant Orders    Referral to Cardiology    Follow Up In [facility] - PCP - Established        Will obtain sleep study done last year. Reminded pt that if she does not hear from us we did not receive test.  Check labs.   EKG is nsr  Will refer to cardio for palpitaitons and sob on exertion  Call  with any problems or questions.   Follow up in one month  If she gets sob she can try her albuterol  May need pft if cardiac work up is neg

## 2023-08-18 ENCOUNTER — TELEPHONE (OUTPATIENT)
Dept: PRIMARY CARE | Facility: CLINIC | Age: 48
End: 2023-08-18
Payer: COMMERCIAL

## 2023-08-18 DIAGNOSIS — D64.9 ANEMIA, UNSPECIFIED TYPE: Primary | ICD-10-CM

## 2023-08-18 LAB
COBALAMIN (VITAMIN B12) (PG/ML) IN SER/PLAS: 404 PG/ML (ref 211–911)
FERRITIN (UG/LL) IN SER/PLAS: 24 UG/L (ref 8–150)
IRON (UG/DL) IN SER/PLAS: 41 UG/DL (ref 35–150)
IRON BINDING CAPACITY (UG/DL) IN SER/PLAS: 437 UG/DL (ref 240–445)
IRON SATURATION (%) IN SER/PLAS: 9 % (ref 25–45)
THYROTROPIN (MIU/L) IN SER/PLAS BY DETECTION LIMIT <= 0.05 MIU/L: 3.59 MIU/L (ref 0.44–3.98)

## 2023-08-18 NOTE — TELEPHONE ENCOUNTER
----- Message from Nitza Reynoso DO sent at 8/18/2023  1:16 PM EDT -----  Her blood count is mildly low. Her iron stores are low. She has up to date EGD/colon and  is still menstruating. Would have her take oral OTC iron once a day  and repeat cbc in a month

## 2023-08-18 NOTE — RESULT ENCOUNTER NOTE
Her blood count is mildly low. Her iron stores are low. She has up to date EGD/colon and  is still menstruating. Would have her take oral OTC iron once a day  and repeat cbc in a month

## 2023-08-28 DIAGNOSIS — R92.8 ABNORMAL MAMMOGRAM: Primary | ICD-10-CM

## 2023-08-28 NOTE — RESULT ENCOUNTER NOTE
There is an assymetry in the left breast that needs better pictures. I ordered left dx mamm with us please help her set  it up

## 2023-09-01 ENCOUNTER — TELEPHONE (OUTPATIENT)
Dept: PRIMARY CARE | Facility: CLINIC | Age: 48
End: 2023-09-01
Payer: COMMERCIAL

## 2023-09-01 NOTE — TELEPHONE ENCOUNTER
DO Felicitas Doe, JEWELL  Her diagnostic mamm  was negative.  Normal breast tissue. Return to yearly screening

## 2023-09-07 ENCOUNTER — TELEPHONE (OUTPATIENT)
Dept: PRIMARY CARE | Facility: CLINIC | Age: 48
End: 2023-09-07
Payer: COMMERCIAL

## 2023-09-07 DIAGNOSIS — G47.33 OBSTRUCTIVE SLEEP APNEA SYNDROME: ICD-10-CM

## 2023-09-07 NOTE — TELEPHONE ENCOUNTER
Nitza Reynoso, DO Felicitas Nation, CMA  She has moderate irving. Recommend cpap therapy  If she is willing to start will order

## 2023-09-08 NOTE — TELEPHONE ENCOUNTER
Sp with patient. She is agreeable to trying cpap. Cpap ordered and faxed to [facility].

## 2023-09-22 ENCOUNTER — APPOINTMENT (OUTPATIENT)
Dept: PRIMARY CARE | Facility: CLINIC | Age: 48
End: 2023-09-22
Payer: COMMERCIAL

## 2023-10-02 ENCOUNTER — APPOINTMENT (OUTPATIENT)
Dept: PRIMARY CARE | Facility: CLINIC | Age: 48
End: 2023-10-02
Payer: COMMERCIAL

## 2023-10-04 PROBLEM — I48.0 PAROXYSMAL ATRIAL FIBRILLATION (MULTI): Status: ACTIVE | Noted: 2023-10-04

## 2023-10-04 PROBLEM — I47.29 NONSUSTAINED VENTRICULAR TACHYCARDIA (MULTI): Status: ACTIVE | Noted: 2023-10-04

## 2023-10-06 ENCOUNTER — TELEPHONE (OUTPATIENT)
Dept: CARDIOLOGY | Facility: CLINIC | Age: 48
End: 2023-10-06
Payer: COMMERCIAL

## 2023-10-06 ENCOUNTER — TELEPHONE (OUTPATIENT)
Dept: CARDIOLOGY | Facility: CLINIC | Age: 48
End: 2023-10-06

## 2023-10-17 ENCOUNTER — APPOINTMENT (OUTPATIENT)
Dept: PRIMARY CARE | Facility: CLINIC | Age: 48
End: 2023-10-17
Payer: COMMERCIAL

## 2023-10-20 ENCOUNTER — APPOINTMENT (OUTPATIENT)
Dept: CARDIOLOGY | Facility: CLINIC | Age: 48
End: 2023-10-20
Payer: COMMERCIAL

## 2023-10-26 DIAGNOSIS — I10 PRIMARY HYPERTENSION: ICD-10-CM

## 2023-10-26 RX ORDER — AMLODIPINE BESYLATE 5 MG/1
5 TABLET ORAL DAILY
Qty: 90 TABLET | Refills: 3 | Status: SHIPPED | OUTPATIENT
Start: 2023-10-26 | End: 2024-04-09 | Stop reason: SDUPTHER

## 2023-11-03 ENCOUNTER — APPOINTMENT (OUTPATIENT)
Dept: CARDIOLOGY | Facility: CLINIC | Age: 48
End: 2023-11-03
Payer: COMMERCIAL

## 2023-11-10 ENCOUNTER — TELEPHONE (OUTPATIENT)
Dept: PRIMARY CARE | Facility: CLINIC | Age: 48
End: 2023-11-10
Payer: COMMERCIAL

## 2023-11-10 NOTE — TELEPHONE ENCOUNTER
Sp with patient.   Informed her that per [facility] her insurance requires a new sleep study in order to provide a CPAP    She will call insurance first before proceeding and call me back if she wants to have another, due to deductible.

## 2023-11-14 ENCOUNTER — TELEPHONE (OUTPATIENT)
Dept: PRIMARY CARE | Facility: CLINIC | Age: 48
End: 2023-11-14
Payer: COMMERCIAL

## 2023-11-20 ENCOUNTER — APPOINTMENT (OUTPATIENT)
Dept: CARDIOLOGY | Facility: CLINIC | Age: 48
End: 2023-11-20
Payer: COMMERCIAL

## 2023-12-08 ENCOUNTER — APPOINTMENT (OUTPATIENT)
Dept: CARDIOLOGY | Facility: CLINIC | Age: 48
End: 2023-12-08
Payer: COMMERCIAL

## 2023-12-15 DIAGNOSIS — E06.3 HASHIMOTO'S THYROIDITIS: ICD-10-CM

## 2023-12-15 RX ORDER — LEVOTHYROXINE SODIUM 50 UG/1
50 TABLET ORAL DAILY
Qty: 90 TABLET | Refills: 3 | Status: SHIPPED | OUTPATIENT
Start: 2023-12-15 | End: 2024-04-09 | Stop reason: SDUPTHER

## 2023-12-19 ENCOUNTER — LAB (OUTPATIENT)
Dept: LAB | Facility: LAB | Age: 48
End: 2023-12-19
Payer: COMMERCIAL

## 2023-12-19 DIAGNOSIS — R00.2 PALPITATIONS: Primary | ICD-10-CM

## 2023-12-19 DIAGNOSIS — D64.9 ANEMIA, UNSPECIFIED TYPE: ICD-10-CM

## 2023-12-19 LAB
ERYTHROCYTE [DISTWIDTH] IN BLOOD BY AUTOMATED COUNT: 15.4 % (ref 11.5–14.5)
HCT VFR BLD AUTO: 36.9 % (ref 36–46)
HGB BLD-MCNC: 11.6 G/DL (ref 12–16)
MCH RBC QN AUTO: 25.9 PG (ref 26–34)
MCHC RBC AUTO-ENTMCNC: 31.4 G/DL (ref 32–36)
MCV RBC AUTO: 82 FL (ref 80–100)
NRBC BLD-RTO: 0 /100 WBCS (ref 0–0)
PLATELET # BLD AUTO: 291 X10*3/UL (ref 150–450)
RBC # BLD AUTO: 4.48 X10*6/UL (ref 4–5.2)
WBC # BLD AUTO: 8.4 X10*3/UL (ref 4.4–11.3)

## 2023-12-19 PROCEDURE — 80048 BASIC METABOLIC PNL TOTAL CA: CPT

## 2023-12-19 PROCEDURE — 36415 COLL VENOUS BLD VENIPUNCTURE: CPT

## 2023-12-19 PROCEDURE — 85027 COMPLETE CBC AUTOMATED: CPT

## 2023-12-19 PROCEDURE — 83735 ASSAY OF MAGNESIUM: CPT

## 2023-12-20 ENCOUNTER — TELEPHONE (OUTPATIENT)
Dept: CARDIOLOGY | Facility: CLINIC | Age: 48
End: 2023-12-20
Payer: COMMERCIAL

## 2023-12-20 LAB
ANION GAP SERPL CALC-SCNC: 11 MMOL/L (ref 10–20)
BUN SERPL-MCNC: 8 MG/DL (ref 6–23)
CALCIUM SERPL-MCNC: 9.2 MG/DL (ref 8.6–10.3)
CHLORIDE SERPL-SCNC: 102 MMOL/L (ref 98–107)
CO2 SERPL-SCNC: 30 MMOL/L (ref 21–32)
CREAT SERPL-MCNC: 0.7 MG/DL (ref 0.5–1.05)
GFR SERPL CREATININE-BSD FRML MDRD: >90 ML/MIN/1.73M*2
GLUCOSE SERPL-MCNC: 86 MG/DL (ref 74–99)
MAGNESIUM SERPL-MCNC: 1.84 MG/DL (ref 1.6–2.4)
POTASSIUM SERPL-SCNC: 3.9 MMOL/L (ref 3.5–5.3)
SODIUM SERPL-SCNC: 139 MMOL/L (ref 136–145)

## 2023-12-20 NOTE — TELEPHONE ENCOUNTER
----- Message from Kelsey Haas MD sent at 12/20/2023  8:04 AM EST -----  Labs look fine from a cardiac standpoint.   ----- Message -----  From: Lab, Background User  Sent: 12/20/2023  12:01 AM EST  To: Kelsey Haas MD

## 2023-12-20 NOTE — RESULT ENCOUNTER NOTE
Her hgb is mildly low but better  Recommend she take an oral iron supplement otc at least once daily

## 2023-12-21 ENCOUNTER — TELEPHONE (OUTPATIENT)
Dept: PRIMARY CARE | Facility: CLINIC | Age: 48
End: 2023-12-21
Payer: COMMERCIAL

## 2023-12-21 NOTE — TELEPHONE ENCOUNTER
----- Message from Nitza Reynoso DO sent at 12/20/2023 10:56 AM EST -----  Her hgb is mildly low but better  Recommend she take an oral iron supplement otc at least once daily

## 2023-12-26 ENCOUNTER — OFFICE VISIT (OUTPATIENT)
Dept: CARDIOLOGY | Facility: CLINIC | Age: 48
End: 2023-12-26
Payer: COMMERCIAL

## 2023-12-26 VITALS
HEIGHT: 62 IN | WEIGHT: 270 LBS | BODY MASS INDEX: 49.69 KG/M2 | SYSTOLIC BLOOD PRESSURE: 132 MMHG | HEART RATE: 81 BPM | DIASTOLIC BLOOD PRESSURE: 90 MMHG | TEMPERATURE: 96.9 F

## 2023-12-26 DIAGNOSIS — U09.9 POST COVID-19 CONDITION, UNSPECIFIED: ICD-10-CM

## 2023-12-26 DIAGNOSIS — Z78.9 NEVER SMOKED ANY SUBSTANCE: ICD-10-CM

## 2023-12-26 DIAGNOSIS — R06.02 SHORTNESS OF BREATH: ICD-10-CM

## 2023-12-26 DIAGNOSIS — R06.02 SHORTNESS OF BREATH ON EXERTION: ICD-10-CM

## 2023-12-26 DIAGNOSIS — R00.2 PALPITATION: ICD-10-CM

## 2023-12-26 DIAGNOSIS — I10 PRIMARY HYPERTENSION: Primary | ICD-10-CM

## 2023-12-26 DIAGNOSIS — I48.0 PAROXYSMAL ATRIAL FIBRILLATION (MULTI): ICD-10-CM

## 2023-12-26 PROBLEM — J06.9 URI (UPPER RESPIRATORY INFECTION): Status: RESOLVED | Noted: 2023-05-23 | Resolved: 2023-12-26

## 2023-12-26 PROCEDURE — 3080F DIAST BP >= 90 MM HG: CPT | Performed by: INTERNAL MEDICINE

## 2023-12-26 PROCEDURE — 3075F SYST BP GE 130 - 139MM HG: CPT | Performed by: INTERNAL MEDICINE

## 2023-12-26 PROCEDURE — 99213 OFFICE O/P EST LOW 20 MIN: CPT | Performed by: INTERNAL MEDICINE

## 2023-12-26 PROCEDURE — 1036F TOBACCO NON-USER: CPT | Performed by: INTERNAL MEDICINE

## 2023-12-26 ASSESSMENT — PATIENT HEALTH QUESTIONNAIRE - PHQ9
1. LITTLE INTEREST OR PLEASURE IN DOING THINGS: NOT AT ALL
SUM OF ALL RESPONSES TO PHQ9 QUESTIONS 1 AND 2: 0
2. FEELING DOWN, DEPRESSED OR HOPELESS: NOT AT ALL

## 2023-12-26 NOTE — PATIENT INSTRUCTIONS
If you start having more palpitations - would consider the use of the Apple Watch, or Storefront to record your heart rate and rhythm while you are having symptoms. You can then bring several rhythm strips you obtain for us to review.

## 2023-12-26 NOTE — PROGRESS NOTES
Patient:  Eli Rockwell  YOB: 1975  MRN: [id number]       Chief Complaint/Active Symptoms:       Eli Rockwell is a 48 y.o. female who returns today for cardiac follow-up.    Palpitations have resolved. Shortness of breath is not an issue at this time - feels she is just not the same since COVID. Still having issues when singing. Not noticeable during activities unless she climbs stairs quickly. Has some ankle edema. Worse is sits for a long time.     No chest pain or discomfort.       Review of Systems    Objective:     Vitals:    12/26/23 1117   BP: 132/90   Pulse: 81   Temp: 36.1 °C (96.9 °F)       Vitals:    12/26/23 1117   Weight: 122 kg (270 lb)       Allergies:     Allergies   Allergen Reactions    Penicillins Hives and Itching    Adhesive Tape-Silicones Rash          Medications:     Current Outpatient Medications   Medication Instructions    albuterol (ProAir HFA) 90 mcg/actuation inhaler 2 puffs, inhalation, Every 4 hours PRN    amLODIPine (NORVASC) 5 mg, oral, Daily    hydroCHLOROthiazide (MICROZIDE) 12.5 mg, oral, Daily    levothyroxine (SYNTHROID, LEVOXYL) 50 mcg, oral, Daily    pantoprazole (PROTONIX) 40 mg, oral, Daily before breakfast, prn       Physical Examination:   GENERAL:  Well developed, well nourished, in no acute distress.  HEENT: NC AT, EOMI with anicteric sclera  NECK:  Supple, no JVD, no bruit.  CHEST:  Symmetric and nontender.  LUNGS:  Clear to auscultation bilaterally, normal respiratory effort.  HEART:  PMI is nonpalpable. RRR with normal S1 and S2, no S3, no mumur or rub. No carotid or abdominal bruits  ABDOMEN: Obese, soft, NT, ND without palpable organomegaly or bruits  EXTREMITIES:  Warm with good color, no clubbing or cyanosis.  There is no edema noted.  PERIPHERAL VASCULAR:  Pulses present and equally palpable; 2+ throughout.  MUSCULOSKELETAL: No significant joint deformity.   NEURO/PSYCH:  Alert and oriented times three with approppriate behavior and  "responses. Nonfocal motor examination with normal gait and ambulation      Lab:     CBC:   Lab Results   Component Value Date    WBC 8.4 12/19/2023    RBC 4.48 12/19/2023    HGB 11.6 (L) 12/19/2023    HCT 36.9 12/19/2023     12/19/2023        CMP:    Lab Results   Component Value Date     12/19/2023    K 3.9 12/19/2023     12/19/2023    CO2 30 12/19/2023    BUN 8 12/19/2023    CREATININE 0.70 12/19/2023    GLUCOSE 86 12/19/2023    CALCIUM 9.2 12/19/2023       Magnesium:    Lab Results   Component Value Date    MG 1.84 12/19/2023       Lipid Profile:    Lab Results   Component Value Date    TRIG 166 (H) 08/19/2022    HDL 38.0 (A) 08/19/2022       TSH:    Lab Results   Component Value Date    TSH 3.59 08/17/2023       BNP:   Lab Results   Component Value Date    BNP 19 04/27/2022        PT/INR:    Lab Results   Component Value Date    PROTIME 10.7 04/27/2022    INR 0.9 04/27/2022       HgBA1c:    No results found for: \"HGBA1C\"    BMP:  Lab Results   Component Value Date     12/19/2023     11/22/2022     08/19/2022     05/31/2022    K 3.9 12/19/2023    K 3.8 11/22/2022    K 3.9 08/19/2022    K 3.9 05/31/2022     12/19/2023     11/22/2022     08/19/2022     05/31/2022    CO2 30 12/19/2023    CO2 26 11/22/2022    CO2 26 08/19/2022    CO2 26 05/31/2022    BUN 8 12/19/2023    BUN 8 11/22/2022    BUN 11 08/19/2022    BUN 11 05/31/2022    CREATININE 0.70 12/19/2023    CREATININE 0.80 11/22/2022    CREATININE 0.78 08/19/2022    CREATININE 0.67 05/31/2022       Cardiac Enzymes:    Lab Results   Component Value Date    TROPHS <3 04/27/2022    TROPHS 3 04/27/2022       Hepatic Function Panel:    Lab Results   Component Value Date    ALKPHOS 61 11/22/2022    ALT 45 11/22/2022    AST 35 11/22/2022    PROT 7.0 11/22/2022    BILITOT 0.4 11/22/2022    BILIDIR 0.1 06/18/2019         Diagnostic Studies:     No echocardiogram results found for the past 12 months  Echo " "with normal systolic and diastolic function 9/2023  No nuclear medicine results found for the past 12 months    No valid procedures specified.    EKG:   No results found for: \"EKG\"  Monitor showed rare PAC and PVC, no atrial fibrillation, no VT, no SVT or pauses. No symptoms during monitor.   Radiology:     No orders to display         ASSESSMENT     Problem List Items Addressed This Visit       HTN (hypertension) - Primary    RESOLVED: Palpitation    Paroxysmal atrial fibrillation (CMS/HCC)    Shortness of breath on exertion    Never smoked any substance    Post covid-19 condition, unspecified    Relevant Orders    Referral to Pulmonology       PLAN     Palpitations with history of paroxysmal atrial fibrillation.  Patient's monitor earlier last year did not show any episodes of A-fib.  She has had no further palpitations and she did not have any symptoms when wearing her monitor.  Due to her reaction to the adhesive with the monitor we suggested she consider either an Apple Watch or other smart watch that can take an EKG or cardia mobile to record her heart rate and rhythm when she has the symptoms.  We can review any tracings when she returns for follow-up.  Shortness of breath on exertion.  Patient is concerned that she has developed some form of condition post-COVID.  She heard from her  that he developed an asthma-like condition after COVID she wonders if she has the same but I would like a referral to pulmonary.  We have given her this referral and will defer any additional imaging or testing to them.  Echo no cardiac cause for her shortness of breath3.  Hypertension.  Blood pressures are controlled on her current medical regimen.  She feels she has some lower extremity edema from the amlodipine.  She can consider with her primary care physician and ACE or ARB and hopefully go off of the amlodipine or decrease the dose.  Hypertension.  Blood pressures are controlled on her current medical regimen.  She " can discuss alternatives to amlodipine with her primary care physician such as an ACE or ARB.  Tobacco and weight status.  The patient is a lifelong non-smoker but morbidly obese with a BMI of 49.  We have encouraged heart healthy weight reduction diet.    In the absence of any new symptoms we will see her in follow-up in 9 months to a year sooner if she has any episodes of palpitations or recordings that she wishes to review events or change in her symptoms.

## 2023-12-28 DIAGNOSIS — I10 PRIMARY HYPERTENSION: ICD-10-CM

## 2023-12-28 RX ORDER — HYDROCHLOROTHIAZIDE 12.5 MG/1
12.5 CAPSULE ORAL DAILY
Qty: 90 CAPSULE | Refills: 1 | Status: SHIPPED | OUTPATIENT
Start: 2023-12-28 | End: 2024-04-09 | Stop reason: SDUPTHER

## 2024-01-03 ENCOUNTER — OFFICE VISIT (OUTPATIENT)
Dept: PRIMARY CARE | Facility: CLINIC | Age: 49
End: 2024-01-03
Payer: COMMERCIAL

## 2024-01-03 VITALS
HEART RATE: 78 BPM | BODY MASS INDEX: 50.05 KG/M2 | OXYGEN SATURATION: 97 % | TEMPERATURE: 97.2 F | SYSTOLIC BLOOD PRESSURE: 130 MMHG | DIASTOLIC BLOOD PRESSURE: 78 MMHG | WEIGHT: 272 LBS | RESPIRATION RATE: 14 BRPM | HEIGHT: 62 IN

## 2024-01-03 DIAGNOSIS — N92.6 IRREGULAR PERIODS: ICD-10-CM

## 2024-01-03 DIAGNOSIS — I10 PRIMARY HYPERTENSION: ICD-10-CM

## 2024-01-03 DIAGNOSIS — R06.02 SOB (SHORTNESS OF BREATH): ICD-10-CM

## 2024-01-03 DIAGNOSIS — Z00.00 WELLNESS EXAMINATION: Primary | ICD-10-CM

## 2024-01-03 DIAGNOSIS — J45.20 MILD INTERMITTENT REACTIVE AIRWAY DISEASE WITHOUT COMPLICATION (HHS-HCC): ICD-10-CM

## 2024-01-03 DIAGNOSIS — D64.9 ANEMIA, UNSPECIFIED TYPE: ICD-10-CM

## 2024-01-03 PROBLEM — I47.29 NONSUSTAINED VENTRICULAR TACHYCARDIA (MULTI): Status: RESOLVED | Noted: 2023-10-04 | Resolved: 2024-01-03

## 2024-01-03 PROBLEM — R09.A2 GLOBUS SENSATION: Status: RESOLVED | Noted: 2023-05-23 | Resolved: 2024-01-03

## 2024-01-03 PROBLEM — R05.9 COUGH: Status: RESOLVED | Noted: 2023-05-23 | Resolved: 2024-01-03

## 2024-01-03 PROBLEM — R10.9 LEFT FLANK PAIN: Status: RESOLVED | Noted: 2023-05-23 | Resolved: 2024-01-03

## 2024-01-03 PROBLEM — E06.3 HASHIMOTO'S THYROIDITIS: Status: RESOLVED | Noted: 2023-05-23 | Resolved: 2024-01-03

## 2024-01-03 PROBLEM — R11.0 NAUSEA IN ADULT: Status: RESOLVED | Noted: 2023-05-23 | Resolved: 2024-01-03

## 2024-01-03 PROBLEM — R10.84 GENERALIZED ABDOMINAL PAIN: Status: RESOLVED | Noted: 2023-05-23 | Resolved: 2024-01-03

## 2024-01-03 PROBLEM — R31.9 HEMATURIA: Status: RESOLVED | Noted: 2023-05-23 | Resolved: 2024-01-03

## 2024-01-03 PROBLEM — N76.0 BACTERIAL VAGINOSIS: Status: RESOLVED | Noted: 2023-05-23 | Resolved: 2024-01-03

## 2024-01-03 PROBLEM — I48.0 PAROXYSMAL ATRIAL FIBRILLATION (MULTI): Status: RESOLVED | Noted: 2023-10-04 | Resolved: 2024-01-03

## 2024-01-03 PROBLEM — D22.9 ATYPICAL MOLE: Status: RESOLVED | Noted: 2023-05-23 | Resolved: 2024-01-03

## 2024-01-03 PROBLEM — B96.89 BACTERIAL VAGINOSIS: Status: RESOLVED | Noted: 2023-05-23 | Resolved: 2024-01-03

## 2024-01-03 PROBLEM — G43.909 MIGRAINE: Status: RESOLVED | Noted: 2023-05-23 | Resolved: 2024-01-03

## 2024-01-03 PROBLEM — U09.9 POST COVID-19 CONDITION, UNSPECIFIED: Status: RESOLVED | Noted: 2023-12-26 | Resolved: 2024-01-03

## 2024-01-03 PROBLEM — M19.90 INFLAMMATORY ARTHROPATHY: Status: RESOLVED | Noted: 2023-05-23 | Resolved: 2024-01-03

## 2024-01-03 PROBLEM — K92.1 HEMATOCHEZIA: Status: RESOLVED | Noted: 2023-05-23 | Resolved: 2024-01-03

## 2024-01-03 PROBLEM — R74.8 ELEVATED LIVER ENZYMES: Status: RESOLVED | Noted: 2023-05-23 | Resolved: 2024-01-03

## 2024-01-03 PROCEDURE — 3078F DIAST BP <80 MM HG: CPT | Performed by: INTERNAL MEDICINE

## 2024-01-03 PROCEDURE — 99396 PREV VISIT EST AGE 40-64: CPT | Performed by: INTERNAL MEDICINE

## 2024-01-03 PROCEDURE — 3075F SYST BP GE 130 - 139MM HG: CPT | Performed by: INTERNAL MEDICINE

## 2024-01-03 PROCEDURE — 1036F TOBACCO NON-USER: CPT | Performed by: INTERNAL MEDICINE

## 2024-01-03 NOTE — PROGRESS NOTES
"Subjective    Eli Rockwell is a 48 y.o. female who presents for Annual Exam.  HPI  Mammogram 8/2023  Colonoscopy was 2019 and she is good for 10 years  PAP declined not due until 205  Declines vaccines  She has heavy periods 4-5 days heavy she passes  clots   She has been having  cramping and occasional spotting between periods.       Review of Systems   All other systems reviewed and are negative.        Objective     /78 (BP Location: Left arm, Patient Position: Sitting, BP Cuff Size: Large adult)   Pulse 78   Temp 36.2 °C (97.2 °F) (Skin)   Resp 14   Ht 1.575 m (5' 2\")   Wt 123 kg (272 lb)   SpO2 97%   BMI 49.75 kg/m²    Physical Exam  Constitutional:       Appearance: Normal appearance.   Cardiovascular:      Rate and Rhythm: Normal rate and regular rhythm.      Pulses: Normal pulses.   Pulmonary:      Effort: Pulmonary effort is normal.      Breath sounds: Normal breath sounds.   Chest:      Chest wall: No mass or tenderness.   Breasts:     Right: Normal.      Left: Normal.          Comments: Dense tissue left breast.    Abdominal:      General: Abdomen is flat.   Musculoskeletal:      Cervical back: Neck supple. No tenderness.   Lymphadenopathy:      Cervical: No cervical adenopathy.      Upper Body:      Right upper body: No supraclavicular or axillary adenopathy.      Left upper body: No supraclavicular or axillary adenopathy.   Neurological:      Mental Status: She is alert.   Psychiatric:         Attention and Perception: Attention and perception normal.         Mood and Affect: Mood and affect normal.       Health Maintenance Due   Topic Date Due    Yearly Adult Physical  Never done    Hepatitis B Vaccines (1 of 3 - 3-dose series) Never done    HIV Screening  Never done    Colorectal Cancer Screening  Never done    COVID-19 Vaccine (1) Never done    MMR Vaccines (1 of 1 - Standard series) Never done    Diabetes Screening  Never done    DTaP/Tdap/Td Vaccines (1 - Tdap) Never done    " Cervical Cancer Screening  03/10/2023    Influenza Vaccine (1) Never done          Assessment/Plan   Problem List Items Addressed This Visit       HTN (hypertension)    Mild intermittent reactive airway disease without complication     Other Visit Diagnoses       Wellness examination    -  Primary    Relevant Orders    CBC    Comprehensive Metabolic Panel    Lipid Panel    SOB (shortness of breath)        Irregular periods        Relevant Orders    Referral to Gynecology    Anemia, unspecified type        Relevant Orders    Iron and TIBC        Check labs. Refer to gyn for abnormal periods. Her pap is utd  Call  with any problems or questions.   She has had the density  in left breast. Diagnostic mamm shows fibroglandular tissue  Follow up in 6 months.

## 2024-04-09 ENCOUNTER — TELEPHONE (OUTPATIENT)
Dept: SCHEDULING | Age: 49
End: 2024-04-09

## 2024-04-09 ENCOUNTER — OFFICE VISIT (OUTPATIENT)
Dept: PRIMARY CARE | Facility: CLINIC | Age: 49
End: 2024-04-09
Payer: COMMERCIAL

## 2024-04-09 VITALS
OXYGEN SATURATION: 99 % | BODY MASS INDEX: 51.21 KG/M2 | RESPIRATION RATE: 20 BRPM | WEIGHT: 280 LBS | HEART RATE: 72 BPM | TEMPERATURE: 98 F | DIASTOLIC BLOOD PRESSURE: 88 MMHG | SYSTOLIC BLOOD PRESSURE: 134 MMHG

## 2024-04-09 DIAGNOSIS — R09.A2 GLOBUS SENSATION: ICD-10-CM

## 2024-04-09 DIAGNOSIS — I10 PRIMARY HYPERTENSION: ICD-10-CM

## 2024-04-09 DIAGNOSIS — K21.9 GASTROESOPHAGEAL REFLUX DISEASE, UNSPECIFIED WHETHER ESOPHAGITIS PRESENT: Primary | ICD-10-CM

## 2024-04-09 DIAGNOSIS — E06.3 HASHIMOTO'S THYROIDITIS: ICD-10-CM

## 2024-04-09 PROCEDURE — 3079F DIAST BP 80-89 MM HG: CPT | Performed by: INTERNAL MEDICINE

## 2024-04-09 PROCEDURE — 1036F TOBACCO NON-USER: CPT | Performed by: INTERNAL MEDICINE

## 2024-04-09 PROCEDURE — 99214 OFFICE O/P EST MOD 30 MIN: CPT | Performed by: INTERNAL MEDICINE

## 2024-04-09 PROCEDURE — 3075F SYST BP GE 130 - 139MM HG: CPT | Performed by: INTERNAL MEDICINE

## 2024-04-09 RX ORDER — LEVOTHYROXINE SODIUM 50 UG/1
50 TABLET ORAL DAILY
Qty: 90 TABLET | Refills: 3 | Status: SHIPPED | OUTPATIENT
Start: 2024-04-09 | End: 2024-05-10 | Stop reason: SDUPTHER

## 2024-04-09 RX ORDER — AMLODIPINE BESYLATE 5 MG/1
5 TABLET ORAL DAILY
Qty: 90 TABLET | Refills: 3 | Status: SHIPPED | OUTPATIENT
Start: 2024-04-09 | End: 2025-04-09

## 2024-04-09 RX ORDER — PANTOPRAZOLE SODIUM 20 MG/1
20 TABLET, DELAYED RELEASE ORAL
Qty: 90 TABLET | Refills: 3 | Status: SHIPPED | OUTPATIENT
Start: 2024-04-09 | End: 2025-04-09

## 2024-04-09 RX ORDER — HYDROCHLOROTHIAZIDE 12.5 MG/1
12.5 CAPSULE ORAL DAILY
Qty: 90 CAPSULE | Refills: 3 | Status: SHIPPED | OUTPATIENT
Start: 2024-04-09 | End: 2025-04-09

## 2024-04-09 RX ORDER — FAMOTIDINE 20 MG/1
20 TABLET, FILM COATED ORAL NIGHTLY PRN
Qty: 90 TABLET | Refills: 3 | Status: SHIPPED | OUTPATIENT
Start: 2024-04-09 | End: 2025-04-09

## 2024-04-09 ASSESSMENT — ENCOUNTER SYMPTOMS: NECK PAIN: 1

## 2024-04-09 NOTE — PROGRESS NOTES
Subjective   Patient ID: Eli Rockwell is a 48 y.o. female who presents for Neck Pain.    Neck Pain   This is a new problem. The current episode started 1 to 4 weeks ago (1 week). The problem occurs constantly. The pain is associated with nothing. The pain is present in the anterior neck. Stiffness is present At night.      She has a feeling of something in her throat.   She had egd/colonosocpy in  2019 and they were negative  Bx were negative.  She does not take the pantoprazole daily  She feels a lump in her throat.   She gets heart burn often. Take pantoprazole about twice a week.     Review of Systems   Musculoskeletal:  Positive for neck pain.   All other systems reviewed and are negative.      Objective   /88 (BP Location: Right arm, Patient Position: Standing, BP Cuff Size: Large adult)   Pulse 72   Temp 36.7 °C (98 °F) (Temporal)   Resp 20   Wt 127 kg (280 lb)   SpO2 99%   BMI 51.21 kg/m²     Physical Exam  Vitals reviewed.   Constitutional:       General: She is not in acute distress.     Appearance: Normal appearance.   Cardiovascular:      Rate and Rhythm: Normal rate and regular rhythm.      Pulses: Normal pulses.      Heart sounds: Normal heart sounds.   Pulmonary:      Effort: Pulmonary effort is normal.      Breath sounds: Normal breath sounds.   Abdominal:      Tenderness: There is no abdominal tenderness.   Musculoskeletal:         General: No swelling.   Skin:     General: Skin is warm and dry.   Neurological:      Mental Status: She is alert.         Assessment/Plan   Problem List Items Addressed This Visit             ICD-10-CM    Gastroesophageal reflux disease - Primary K21.9    Relevant Medications    pantoprazole (Protonix) 20 mg EC tablet    famotidine (Pepcid) 20 mg tablet    HTN (hypertension) I10    Relevant Medications    hydroCHLOROthiazide (Microzide) 12.5 mg capsule    amLODIPine (Norvasc) 5 mg tablet     Other Visit Diagnoses         Codes    Hashimoto's thyroiditis      E06.3    Relevant Medications    levothyroxine (Synthroid, Levoxyl) 50 mcg tablet    Globus sensation     R09.A2    Relevant Orders    US thyroid        Her sx are likely due to GERD. Will check us.   She does not want to take pantoprazole 40 daily so will decrease to 20 daily and add pepcid at hs prn  She had normal egd/colonoscopy in 2019  Recommend weight loss. Noom or other weight loss program   Call  with any problems or questions.   Follow up in 6-8 weeks if no better will refer to ent for visualization of cords

## 2024-04-10 ENCOUNTER — HOSPITAL ENCOUNTER (OUTPATIENT)
Dept: RADIOLOGY | Facility: CLINIC | Age: 49
Discharge: HOME | End: 2024-04-10
Payer: COMMERCIAL

## 2024-04-10 DIAGNOSIS — R09.A2 GLOBUS SENSATION: ICD-10-CM

## 2024-04-10 PROCEDURE — 76536 US EXAM OF HEAD AND NECK: CPT

## 2024-04-12 ENCOUNTER — TELEPHONE (OUTPATIENT)
Dept: PRIMARY CARE | Facility: CLINIC | Age: 49
End: 2024-04-12
Payer: COMMERCIAL

## 2024-04-12 NOTE — TELEPHONE ENCOUNTER
----- Message from Nitza Reynoso DO sent at 4/12/2024 10:57 AM EDT -----  Thyroid is small with benign appearing lymphnodes

## 2024-05-08 ENCOUNTER — OFFICE VISIT (OUTPATIENT)
Dept: PRIMARY CARE | Facility: CLINIC | Age: 49
End: 2024-05-08
Payer: COMMERCIAL

## 2024-05-08 VITALS
SYSTOLIC BLOOD PRESSURE: 138 MMHG | HEART RATE: 102 BPM | DIASTOLIC BLOOD PRESSURE: 88 MMHG | RESPIRATION RATE: 16 BRPM | TEMPERATURE: 97.5 F | BODY MASS INDEX: 49.38 KG/M2 | WEIGHT: 270 LBS | OXYGEN SATURATION: 95 %

## 2024-05-08 DIAGNOSIS — R00.2 PALPITATION: ICD-10-CM

## 2024-05-08 DIAGNOSIS — R09.A2 GLOBUS SENSATION: Primary | ICD-10-CM

## 2024-05-08 DIAGNOSIS — K21.9 GASTROESOPHAGEAL REFLUX DISEASE, UNSPECIFIED WHETHER ESOPHAGITIS PRESENT: ICD-10-CM

## 2024-05-08 DIAGNOSIS — E03.9 ACQUIRED HYPOTHYROIDISM: ICD-10-CM

## 2024-05-08 DIAGNOSIS — I10 PRIMARY HYPERTENSION: ICD-10-CM

## 2024-05-08 PROCEDURE — 99214 OFFICE O/P EST MOD 30 MIN: CPT | Performed by: INTERNAL MEDICINE

## 2024-05-08 PROCEDURE — 1036F TOBACCO NON-USER: CPT | Performed by: INTERNAL MEDICINE

## 2024-05-08 PROCEDURE — 3075F SYST BP GE 130 - 139MM HG: CPT | Performed by: INTERNAL MEDICINE

## 2024-05-08 PROCEDURE — 3079F DIAST BP 80-89 MM HG: CPT | Performed by: INTERNAL MEDICINE

## 2024-05-08 NOTE — PROGRESS NOTES
Subjective    Eli Rockwell is a 49 y.o. female who presents for Neck Pain.  HPI  Follow up neck pain.  Seen a month ago.   Reports worsening sx   Feeling as if something is stuck in her throat.   Does not take pantoprazole regularly   She takes it every other day.  She has taken pepcid once\  she is anxious she is not sleeping. She does not want anything even otc for sleep due to fear she will not tolerate  She has not gotten her labs done.     Review of Systems   All other systems reviewed and are negative.        Objective     /88 (BP Location: Left arm, Patient Position: Sitting, BP Cuff Size: Adult)   Pulse 102   Temp 36.4 °C (97.5 °F) (Skin)   Resp 16   Wt 122 kg (270 lb)   SpO2 95%   BMI 49.38 kg/m²    Physical Exam  Vitals reviewed.   Constitutional:       General: She is not in acute distress.     Appearance: Normal appearance.   HENT:      Mouth/Throat:      Mouth: Mucous membranes are moist.      Pharynx: Oropharynx is clear.   Neck:      Comments: No neck mass  Cardiovascular:      Rate and Rhythm: Normal rate and regular rhythm.      Pulses: Normal pulses.      Heart sounds: Normal heart sounds.   Pulmonary:      Effort: Pulmonary effort is normal.      Breath sounds: Normal breath sounds.   Abdominal:      Tenderness: There is no abdominal tenderness.   Musculoskeletal:         General: No swelling.      Cervical back: Neck supple. No tenderness.   Lymphadenopathy:      Cervical: No cervical adenopathy.   Skin:     General: Skin is warm and dry.   Neurological:      Mental Status: She is alert.       Health Maintenance Due   Topic Date Due    HIV Screening  Never done    MMR Vaccines (1 of 1 - Standard series) Never done    Pneumococcal Vaccine: Pediatrics (0 to 5 Years) and At-Risk Patients (6 to 64 Years) (1 of 2 - PCV) Never done    Diabetes Screening  Never done    Hepatitis B Vaccines (1 of 3 - 19+ 3-dose series) Never done    DTaP/Tdap/Td Vaccines (1 - Tdap) Never done    Cervical  Cancer Screening  03/10/2023    COVID-19 Vaccine (1 - 2023-24 season) Never done          Assessment/Plan   Problem List Items Addressed This Visit       Gastroesophageal reflux disease    HTN (hypertension)    Hypothyroid    Relevant Orders    TSH with reflex to Free T4 if abnormal     Other Visit Diagnoses       Globus sensation    -  Primary    Relevant Orders    Referral to ENT    Palpitation        Relevant Orders    Magnesium        Recommend she take pantoprazole daily  Refer to ent.   Check labs.  She is anxious  She does not want to take anything for sleep. Recommend otc magnesium glycinate  one tab a night 200mg  She has complaints of swelling of her ankles with her bp med  She can dc amlodipine and if her labs are ok will increase her water pill  Call  with any problems or questions.   Follow up in 4-6 weeks   No

## 2024-05-09 ENCOUNTER — LAB (OUTPATIENT)
Dept: LAB | Facility: LAB | Age: 49
End: 2024-05-09
Payer: COMMERCIAL

## 2024-05-09 DIAGNOSIS — D64.9 ANEMIA, UNSPECIFIED TYPE: ICD-10-CM

## 2024-05-09 DIAGNOSIS — E03.9 ACQUIRED HYPOTHYROIDISM: ICD-10-CM

## 2024-05-09 DIAGNOSIS — Z00.00 WELLNESS EXAMINATION: ICD-10-CM

## 2024-05-09 DIAGNOSIS — R00.2 PALPITATION: ICD-10-CM

## 2024-05-09 LAB
ALBUMIN SERPL BCP-MCNC: 4 G/DL (ref 3.4–5)
ALP SERPL-CCNC: 63 U/L (ref 33–110)
ALT SERPL W P-5'-P-CCNC: 66 U/L (ref 7–45)
ANION GAP SERPL CALC-SCNC: 12 MMOL/L (ref 10–20)
AST SERPL W P-5'-P-CCNC: 60 U/L (ref 9–39)
BILIRUB SERPL-MCNC: 0.5 MG/DL (ref 0–1.2)
BUN SERPL-MCNC: 9 MG/DL (ref 6–23)
CALCIUM SERPL-MCNC: 9.2 MG/DL (ref 8.6–10.3)
CHLORIDE SERPL-SCNC: 103 MMOL/L (ref 98–107)
CHOLEST SERPL-MCNC: 164 MG/DL (ref 0–199)
CHOLESTEROL/HDL RATIO: 5
CO2 SERPL-SCNC: 29 MMOL/L (ref 21–32)
CREAT SERPL-MCNC: 0.76 MG/DL (ref 0.5–1.05)
EGFRCR SERPLBLD CKD-EPI 2021: >90 ML/MIN/1.73M*2
ERYTHROCYTE [DISTWIDTH] IN BLOOD BY AUTOMATED COUNT: 15.3 % (ref 11.5–14.5)
GLUCOSE SERPL-MCNC: 87 MG/DL (ref 74–99)
HCT VFR BLD AUTO: 38.6 % (ref 36–46)
HDLC SERPL-MCNC: 32.7 MG/DL
HGB BLD-MCNC: 12.3 G/DL (ref 12–16)
IRON SATN MFR SERPL: 13 % (ref 25–45)
IRON SERPL-MCNC: 56 UG/DL (ref 35–150)
LDLC SERPL CALC-MCNC: 96 MG/DL
MAGNESIUM SERPL-MCNC: 2.08 MG/DL (ref 1.6–2.4)
MCH RBC QN AUTO: 26.9 PG (ref 26–34)
MCHC RBC AUTO-ENTMCNC: 31.9 G/DL (ref 32–36)
MCV RBC AUTO: 85 FL (ref 80–100)
NON HDL CHOLESTEROL: 131 MG/DL (ref 0–149)
NRBC BLD-RTO: 0 /100 WBCS (ref 0–0)
PLATELET # BLD AUTO: 325 X10*3/UL (ref 150–450)
POTASSIUM SERPL-SCNC: 3.8 MMOL/L (ref 3.5–5.3)
PROT SERPL-MCNC: 6.7 G/DL (ref 6.4–8.2)
RBC # BLD AUTO: 4.57 X10*6/UL (ref 4–5.2)
SODIUM SERPL-SCNC: 140 MMOL/L (ref 136–145)
T4 FREE SERPL-MCNC: 1.07 NG/DL (ref 0.61–1.12)
TIBC SERPL-MCNC: 422 UG/DL (ref 240–445)
TRIGL SERPL-MCNC: 178 MG/DL (ref 0–149)
TSH SERPL-ACNC: 5.03 MIU/L (ref 0.44–3.98)
UIBC SERPL-MCNC: 366 UG/DL (ref 110–370)
VLDL: 36 MG/DL (ref 0–40)
WBC # BLD AUTO: 7.1 X10*3/UL (ref 4.4–11.3)

## 2024-05-09 PROCEDURE — 80053 COMPREHEN METABOLIC PANEL: CPT

## 2024-05-09 PROCEDURE — 84443 ASSAY THYROID STIM HORMONE: CPT

## 2024-05-09 PROCEDURE — 84439 ASSAY OF FREE THYROXINE: CPT

## 2024-05-09 PROCEDURE — 83550 IRON BINDING TEST: CPT

## 2024-05-09 PROCEDURE — 36415 COLL VENOUS BLD VENIPUNCTURE: CPT

## 2024-05-09 PROCEDURE — 83540 ASSAY OF IRON: CPT

## 2024-05-09 PROCEDURE — 83735 ASSAY OF MAGNESIUM: CPT

## 2024-05-09 PROCEDURE — 85027 COMPLETE CBC AUTOMATED: CPT

## 2024-05-09 PROCEDURE — 80061 LIPID PANEL: CPT

## 2024-05-10 ENCOUNTER — TELEPHONE (OUTPATIENT)
Dept: PRIMARY CARE | Facility: CLINIC | Age: 49
End: 2024-05-10
Payer: COMMERCIAL

## 2024-05-10 DIAGNOSIS — E06.3 HASHIMOTO'S THYROIDITIS: ICD-10-CM

## 2024-05-10 DIAGNOSIS — R79.89 ELEVATED LFTS: Primary | ICD-10-CM

## 2024-05-10 RX ORDER — LEVOTHYROXINE SODIUM 75 UG/1
75 TABLET ORAL DAILY
Qty: 90 TABLET | Refills: 3 | Status: SHIPPED | OUTPATIENT
Start: 2024-05-10 | End: 2025-05-10

## 2024-05-10 NOTE — TELEPHONE ENCOUNTER
----- Message from Nitza Reynoso DO sent at 5/10/2024  1:39 PM EDT -----  Her thyroid is under treated. Increase her synthroid to 75 mcg daily. Recheck tsh in 6 weeks.   Her lft are elevated. Like due to nafld. Decrease sugars and will also recheck

## 2024-05-10 NOTE — RESULT ENCOUNTER NOTE
Her thyroid is under treated. Increase her synthroid to 75 mcg daily. Recheck tsh in 6 weeks.   Her lft are elevated. Like due to nafld. Decrease sugars and will also recheck

## 2024-05-16 ENCOUNTER — OFFICE VISIT (OUTPATIENT)
Dept: OTOLARYNGOLOGY | Facility: CLINIC | Age: 49
End: 2024-05-16
Payer: COMMERCIAL

## 2024-05-16 DIAGNOSIS — J34.2 DNS (DEVIATED NASAL SEPTUM): ICD-10-CM

## 2024-05-16 DIAGNOSIS — G47.33 OSA (OBSTRUCTIVE SLEEP APNEA): Primary | ICD-10-CM

## 2024-05-16 DIAGNOSIS — R09.A2 GLOBUS SENSATION: ICD-10-CM

## 2024-05-16 PROCEDURE — 31575 DIAGNOSTIC LARYNGOSCOPY: CPT | Performed by: PHYSICIAN ASSISTANT

## 2024-05-16 PROCEDURE — 99203 OFFICE O/P NEW LOW 30 MIN: CPT | Performed by: PHYSICIAN ASSISTANT

## 2024-05-16 NOTE — PROGRESS NOTES
Eli Rockwell is a 49 y.o. year old female patient with GLOBUS     Patient presents to the office today for assessment of her throat.  Patient reporting foreign body sensation in her throat which she reports began between 4 and 8 weeks ago.  She believes the symptoms began around the same time as beginning CPAP therapy for sleep apnea.  She is here today for further assessment.  She had ultrasound of the thyroid completed which showed nothing worrisome.  The patient does have chronic nasal congestion and obstruction left greater than right.  She reports a known history of deviated nasal septum.  She is without other ENT related concerns at this time.      Review of Systems   All other systems reviewed and are negative.        Physical Exam:   General appearance: No acute distress. Normal facies. Symmetric facial movement. No gross lesions of the face are noted.  The external ear structures appear normal. The ear canals patent and the tympanic membranes are intact without evidence of air-fluid levels, retraction, or congenital defects.  DNS left. Examination is noted for normal healthy mucosal membranes without any evidence of lesions, polyps, or exudate. The tongue is normally mobile. There are no lesions on the gingiva, buccal, or oral mucosa. There are no oral cavity masses.  Patient with no significant tonsillar hypertrophy.  The neck is negative for mass lymphadenopathy. The trachea and parotid are clear. The thyroid bed is grossly unremarkable. The salivary gland structures are grossly unremarkable.     in order to assess the larynx, flexible laryngoscopy was performed based on the patient's history.    After topical anesthesia, a very complete flexible laryngoscopy was performed. This examination reveals a normal appearance to the laryngeal structures including the true cords, false cords, epiglottis, base of tongue, and piriform sinus, except as noted.    Assessment/Plan   KUNAL  DNS  Globus sensation      Patient was seen in the office today for assessment of her throat.  Patient experiencing foreign body sensation in the throat with unremarkable exam including flexible laryngoscope.  Patient was given reassurance in this regard.  The patient has sleep apnea but does not have significant tonsillar hypertrophy.  The base of tongue was clear with no significant lingual tonsil hypertrophy.  I recommend continuation of CPAP therapy.  In regards to the patient's nose the patient has septal deviation left and if she decides that she would like to have septoplasty performed she may contact the office at her convenience for further discussion regarding septoplasty and turbinate surgery.  We will see her back as needed.

## 2024-05-29 ENCOUNTER — APPOINTMENT (OUTPATIENT)
Dept: PRIMARY CARE | Facility: CLINIC | Age: 49
End: 2024-05-29
Payer: COMMERCIAL

## 2024-07-09 ENCOUNTER — APPOINTMENT (OUTPATIENT)
Dept: PRIMARY CARE | Facility: CLINIC | Age: 49
End: 2024-07-09
Payer: COMMERCIAL

## 2024-07-09 ENCOUNTER — LAB (OUTPATIENT)
Dept: LAB | Facility: LAB | Age: 49
End: 2024-07-09
Payer: COMMERCIAL

## 2024-07-09 VITALS
SYSTOLIC BLOOD PRESSURE: 130 MMHG | WEIGHT: 273 LBS | RESPIRATION RATE: 14 BRPM | BODY MASS INDEX: 50.24 KG/M2 | HEART RATE: 82 BPM | DIASTOLIC BLOOD PRESSURE: 78 MMHG | TEMPERATURE: 97.5 F | OXYGEN SATURATION: 97 % | HEIGHT: 62 IN

## 2024-07-09 DIAGNOSIS — E06.3 HASHIMOTO'S THYROIDITIS: ICD-10-CM

## 2024-07-09 DIAGNOSIS — R79.89 ELEVATED LFTS: ICD-10-CM

## 2024-07-09 DIAGNOSIS — E03.9 ACQUIRED HYPOTHYROIDISM: ICD-10-CM

## 2024-07-09 DIAGNOSIS — I10 PRIMARY HYPERTENSION: Primary | ICD-10-CM

## 2024-07-09 LAB
ALBUMIN SERPL BCP-MCNC: 4.1 G/DL (ref 3.4–5)
ALP SERPL-CCNC: 77 U/L (ref 33–110)
ALT SERPL W P-5'-P-CCNC: 65 U/L (ref 7–45)
ANION GAP SERPL CALC-SCNC: 11 MMOL/L (ref 10–20)
AST SERPL W P-5'-P-CCNC: 58 U/L (ref 9–39)
BILIRUB SERPL-MCNC: 0.4 MG/DL (ref 0–1.2)
BUN SERPL-MCNC: 11 MG/DL (ref 6–23)
CALCIUM SERPL-MCNC: 9.2 MG/DL (ref 8.6–10.3)
CHLORIDE SERPL-SCNC: 105 MMOL/L (ref 98–107)
CO2 SERPL-SCNC: 26 MMOL/L (ref 21–32)
CREAT SERPL-MCNC: 0.72 MG/DL (ref 0.5–1.05)
EGFRCR SERPLBLD CKD-EPI 2021: >90 ML/MIN/1.73M*2
GLUCOSE SERPL-MCNC: 94 MG/DL (ref 74–99)
POTASSIUM SERPL-SCNC: 4.3 MMOL/L (ref 3.5–5.3)
PROT SERPL-MCNC: 6.7 G/DL (ref 6.4–8.2)
SODIUM SERPL-SCNC: 138 MMOL/L (ref 136–145)
TSH SERPL-ACNC: 2.8 MIU/L (ref 0.44–3.98)

## 2024-07-09 PROCEDURE — 99214 OFFICE O/P EST MOD 30 MIN: CPT | Performed by: INTERNAL MEDICINE

## 2024-07-09 PROCEDURE — 3078F DIAST BP <80 MM HG: CPT | Performed by: INTERNAL MEDICINE

## 2024-07-09 PROCEDURE — 80053 COMPREHEN METABOLIC PANEL: CPT

## 2024-07-09 PROCEDURE — 36415 COLL VENOUS BLD VENIPUNCTURE: CPT

## 2024-07-09 PROCEDURE — 1036F TOBACCO NON-USER: CPT | Performed by: INTERNAL MEDICINE

## 2024-07-09 PROCEDURE — 84443 ASSAY THYROID STIM HORMONE: CPT

## 2024-07-09 PROCEDURE — 3075F SYST BP GE 130 - 139MM HG: CPT | Performed by: INTERNAL MEDICINE

## 2024-07-09 NOTE — PROGRESS NOTES
"Subjective    Eli Rockwell is a 49 y.o. female who presents for Hypertension.  HPI  Fu htn  Dc'ed hydrochlorothiazide, taking amlodipine.   Denies leg swelling.   He heart burn is good.   Her  GERD sx are better  She went to see ent and her cords were clear     Review of Systems   All other systems reviewed and are negative.        Objective     /78 (BP Location: Left arm, Patient Position: Sitting, BP Cuff Size: Adult)   Pulse 82   Temp 36.4 °C (97.5 °F) (Skin)   Resp 14   Ht 1.575 m (5' 2\")   Wt 124 kg (273 lb)   SpO2 97%   BMI 49.93 kg/m²    Physical Exam  Vitals reviewed.   Constitutional:       General: She is not in acute distress.     Appearance: Normal appearance.   Cardiovascular:      Rate and Rhythm: Normal rate and regular rhythm.      Pulses: Normal pulses.      Heart sounds: Normal heart sounds.   Pulmonary:      Effort: Pulmonary effort is normal.      Breath sounds: Normal breath sounds.   Abdominal:      Tenderness: There is no abdominal tenderness.   Musculoskeletal:         General: No swelling.   Skin:     General: Skin is warm and dry.   Neurological:      Mental Status: She is alert.       Health Maintenance Due   Topic Date Due    HIV Screening  Never done    MMR Vaccines (1 of 1 - Standard series) Never done    Pneumococcal Vaccine: Pediatrics (0 to 5 Years) and At-Risk Patients (6 to 64 Years) (1 of 2 - PCV) Never done    Diabetes Screening  Never done    Hepatitis B Vaccines (1 of 3 - 19+ 3-dose series) Never done    DTaP/Tdap/Td Vaccines (1 - Tdap) Never done    Cervical Cancer Screening  03/10/2023    COVID-19 Vaccine (1 - 2023-24 season) Never done    Mammogram  09/01/2024          Assessment/Plan   Problem List Items Addressed This Visit       HTN (hypertension) - Primary    Hypothyroid     Other Visit Diagnoses       Elevated LFTs            Her blood pressure is ok.  Her swelling is gone but she is still on amlodipine. It is likely improved from increasing her " thyroid.  Cont her current bp meds as she seems to be tolerating and her bp is good  Check labs. Decrease sugars and processed carbs  Repeat her liver enzymes and her thyroid.  Call  with any problems or questions.   Follow up in 6 months

## 2024-07-10 DIAGNOSIS — R79.89 ELEVATED LFTS: Primary | ICD-10-CM

## 2024-07-10 NOTE — RESULT ENCOUNTER NOTE
Her liver enzymes are slightly elevated still.  Has hx of nafld. Would decrease sugar and processed carbs and will just repeat in a 2-3 months  Orders are in

## 2024-07-11 ENCOUNTER — TELEPHONE (OUTPATIENT)
Dept: PRIMARY CARE | Facility: CLINIC | Age: 49
End: 2024-07-11
Payer: COMMERCIAL

## 2024-07-11 NOTE — TELEPHONE ENCOUNTER
----- Message from Nitza Reynoso sent at 7/10/2024  5:28 PM EDT -----  Her liver enzymes are slightly elevated still.  Has hx of nafld. Would decrease sugar and processed carbs and will just repeat in a 2-3 months  Orders are in

## 2024-11-25 ENCOUNTER — OFFICE VISIT (OUTPATIENT)
Dept: PRIMARY CARE | Facility: CLINIC | Age: 49
End: 2024-11-25
Payer: COMMERCIAL

## 2024-11-25 VITALS
BODY MASS INDEX: 51.07 KG/M2 | HEART RATE: 96 BPM | TEMPERATURE: 98 F | WEIGHT: 279.2 LBS | SYSTOLIC BLOOD PRESSURE: 122 MMHG | DIASTOLIC BLOOD PRESSURE: 72 MMHG | RESPIRATION RATE: 16 BRPM

## 2024-11-25 DIAGNOSIS — K59.00 CONSTIPATION, UNSPECIFIED CONSTIPATION TYPE: ICD-10-CM

## 2024-11-25 DIAGNOSIS — K64.8 INTERNAL HEMORRHOIDS: Primary | ICD-10-CM

## 2024-11-25 PROCEDURE — 99214 OFFICE O/P EST MOD 30 MIN: CPT | Performed by: NURSE PRACTITIONER

## 2024-11-25 PROCEDURE — 1036F TOBACCO NON-USER: CPT | Performed by: NURSE PRACTITIONER

## 2024-11-25 PROCEDURE — 3074F SYST BP LT 130 MM HG: CPT | Performed by: NURSE PRACTITIONER

## 2024-11-25 PROCEDURE — 3078F DIAST BP <80 MM HG: CPT | Performed by: NURSE PRACTITIONER

## 2024-11-25 RX ORDER — HYDROCORTISONE ACETATE 25 MG/1
25 SUPPOSITORY RECTAL 2 TIMES DAILY PRN
Qty: 24 SUPPOSITORY | Refills: 1 | Status: SHIPPED | OUTPATIENT
Start: 2024-11-25 | End: 2024-12-25

## 2024-11-25 RX ORDER — SENNOSIDES 25 MG/1
1 TABLET, FILM COATED ORAL EVERY 6 HOURS PRN
Qty: 30 G | Refills: 1 | Status: SHIPPED | OUTPATIENT
Start: 2024-11-25

## 2024-11-25 RX ORDER — HYDROCORTISONE ACETATE 25 MG/1
25 SUPPOSITORY RECTAL 2 TIMES DAILY PRN
Qty: 24 SUPPOSITORY | Refills: 1 | Status: SHIPPED | OUTPATIENT
Start: 2024-11-25 | End: 2024-11-25

## 2024-11-25 ASSESSMENT — ENCOUNTER SYMPTOMS
DIARRHEA: 0
ANAL BLEEDING: 0
CONSTIPATION: 1

## 2024-11-25 NOTE — PROGRESS NOTES
Subjective   Eli Rockwell is a 49 y.o. female who presents for Hemorrhoids. She has a h/o hemorrhoids that do bleed at times, but have never been painful before. States she has IBS and recently has had some constipation and not drinking enough water.  HPI Painful hemorrhoids to the point she can't even move. She stated it feels like pulsing cramps.   Review of Systems   Gastrointestinal:  Positive for constipation. Negative for anal bleeding (small amount of BRB on toilet tissue) and diarrhea.     Objective   Physical Exam  Genitourinary:         Comments: Several small external rectal hemorrhoids mostly at right posterior position.  Large internal non-bleeding hemorrhoid with palpable pulsation noted.       /72   Pulse 96   Temp 36.7 °C (98 °F) (Temporal)   Resp 16   Wt 127 kg (279 lb 3.2 oz)   BMI 51.07 kg/m²   Assessment/Plan   Problem List Items Addressed This Visit       Internal hemorrhoids - Primary    Relevant Medications    phenylephrine 0.25 % suppository    lidocaine (Hemorrhoidal Relief) 5 % cream cream    hydrocortisone (Anusol-HC) 25 mg suppository     Other Visit Diagnoses       Constipation, unspecified constipation type        Has stool softeners. Advised increased water. Increase fiber in diet.          F/U with PCP if symptoms do not improve or if they worsen.

## 2024-12-26 ENCOUNTER — OFFICE VISIT (OUTPATIENT)
Dept: PRIMARY CARE | Facility: CLINIC | Age: 49
End: 2024-12-26
Payer: COMMERCIAL

## 2024-12-26 VITALS
TEMPERATURE: 97.1 F | HEART RATE: 67 BPM | RESPIRATION RATE: 16 BRPM | OXYGEN SATURATION: 97 % | WEIGHT: 280 LBS | BODY MASS INDEX: 51.21 KG/M2 | SYSTOLIC BLOOD PRESSURE: 128 MMHG | DIASTOLIC BLOOD PRESSURE: 80 MMHG

## 2024-12-26 DIAGNOSIS — J40 BRONCHITIS: Primary | ICD-10-CM

## 2024-12-26 PROCEDURE — 99213 OFFICE O/P EST LOW 20 MIN: CPT | Performed by: NURSE PRACTITIONER

## 2024-12-26 PROCEDURE — 3074F SYST BP LT 130 MM HG: CPT | Performed by: NURSE PRACTITIONER

## 2024-12-26 PROCEDURE — 3079F DIAST BP 80-89 MM HG: CPT | Performed by: NURSE PRACTITIONER

## 2024-12-26 PROCEDURE — 1036F TOBACCO NON-USER: CPT | Performed by: NURSE PRACTITIONER

## 2024-12-26 RX ORDER — HYDROCHLOROTHIAZIDE 12.5 MG/1
1 TABLET ORAL
COMMUNITY
Start: 2024-03-19

## 2024-12-26 RX ORDER — AZITHROMYCIN 250 MG/1
TABLET, FILM COATED ORAL
Qty: 6 TABLET | Refills: 0 | Status: SHIPPED | OUTPATIENT
Start: 2024-12-26 | End: 2024-12-30

## 2024-12-26 RX ORDER — AZITHROMYCIN 250 MG/1
TABLET, FILM COATED ORAL
Qty: 6 TABLET | Refills: 0 | Status: SHIPPED | OUTPATIENT
Start: 2024-12-26 | End: 2024-12-26 | Stop reason: SDUPTHER

## 2024-12-26 RX ORDER — FAMOTIDINE 20 MG/1
20 TABLET, FILM COATED ORAL AS NEEDED
COMMUNITY
Start: 2024-07-23

## 2024-12-26 ASSESSMENT — ENCOUNTER SYMPTOMS
SHORTNESS OF BREATH: 0
WHEEZING: 0
CONSTIPATION: 0
FEVER: 0
RHINORRHEA: 0
NERVOUS/ANXIOUS: 0
SORE THROAT: 0
DIARRHEA: 0
HEADACHES: 0
COUGH: 1
CHILLS: 0
DYSURIA: 0
FATIGUE: 1

## 2024-12-26 NOTE — PROGRESS NOTES
Subjective   Patient ID: Eli Rockwell is a 49 y.o. female who presents for Cough.    Patient took 1/2 dose of dayquil without improvement. She has post-nasal drainage and dry cough.     Cough  This is a new problem. Episode onset: 2 weeks. The problem has been gradually worsening. The problem occurs every few minutes. The cough is Non-productive. Associated symptoms include nasal congestion. Pertinent negatives include no chest pain, chills, ear congestion, ear pain, fever, headaches, rhinorrhea, sore throat, shortness of breath or wheezing. Treatments tried: DayQuil. The treatment provided no relief.        Review of Systems   Constitutional:  Positive for fatigue. Negative for chills and fever.   HENT:  Negative for ear pain, rhinorrhea and sore throat.    Respiratory:  Positive for cough. Negative for shortness of breath and wheezing.    Cardiovascular:  Negative for chest pain.   Gastrointestinal:  Negative for constipation and diarrhea.   Genitourinary:  Negative for dysuria.   Neurological:  Negative for headaches.   Psychiatric/Behavioral:  The patient is not nervous/anxious.        Objective   /80   Pulse 67   Temp 36.2 °C (97.1 °F) (Tympanic)   Resp 16   Wt 127 kg (280 lb)   SpO2 97%   BMI 51.21 kg/m²     Physical Exam  Vitals reviewed.   Constitutional:       Appearance: Normal appearance.   HENT:      Head: Normocephalic.      Right Ear: Tympanic membrane, ear canal and external ear normal.      Left Ear: Tympanic membrane, ear canal and external ear normal.      Nose: Congestion present.      Mouth/Throat:      Mouth: Mucous membranes are moist.      Comments: Post-nasal drainage noted    Eyes:      Extraocular Movements: Extraocular movements intact.      Conjunctiva/sclera: Conjunctivae normal.      Pupils: Pupils are equal, round, and reactive to light.   Cardiovascular:      Rate and Rhythm: Normal rate and regular rhythm.      Heart sounds: Normal heart sounds.   Pulmonary:       Effort: Pulmonary effort is normal.      Breath sounds: Normal breath sounds. Transmitted upper airway sounds present.   Musculoskeletal:      Cervical back: Normal range of motion and neck supple.   Neurological:      Mental Status: She is alert.   Psychiatric:         Mood and Affect: Mood normal.         Behavior: Behavior normal.         Thought Content: Thought content normal.         Judgment: Judgment normal.         Assessment/Plan   Problem List Items Addressed This Visit    None  Visit Diagnoses       Bronchitis    -  Primary    Relevant Medications    azithromycin (Zithromax) 250 mg tablet          Patient Instructions   Patient to start taking azithromycin as ordered, and tylenol and ibuprofen as needed. Call the office if no improvement by Monday. Follow-up with PCP in 1-2 weeks as needed.

## 2024-12-26 NOTE — PATIENT INSTRUCTIONS
Patient to start taking azithromycin as ordered, and tylenol and ibuprofen as needed. Call the office if no improvement by Monday. Follow-up with PCP in 1-2 weeks as needed.

## 2024-12-31 ENCOUNTER — TELEPHONE (OUTPATIENT)
Dept: PRIMARY CARE | Facility: CLINIC | Age: 49
End: 2024-12-31
Payer: COMMERCIAL

## 2024-12-31 NOTE — TELEPHONE ENCOUNTER
Pt left vm stating she was seen at [facility].  Given Zpack.  Reports some continued symptoms.

## 2025-01-12 ASSESSMENT — PROMIS GLOBAL HEALTH SCALE
CARRYOUT_PHYSICAL_ACTIVITIES: MODERATELY
RATE_AVERAGE_PAIN: 1
CARRYOUT_SOCIAL_ACTIVITIES: POOR
RATE_SOCIAL_SATISFACTION: EXCELLENT
RATE_AVERAGE_FATIGUE: MODERATE
EMOTIONAL_PROBLEMS: SOMETIMES
RATE_QUALITY_OF_LIFE: VERY GOOD
RATE_MENTAL_HEALTH: FAIR
RATE_GENERAL_HEALTH: FAIR
RATE_PHYSICAL_HEALTH: FAIR

## 2025-01-14 ENCOUNTER — APPOINTMENT (OUTPATIENT)
Dept: PRIMARY CARE | Facility: CLINIC | Age: 50
End: 2025-01-14
Payer: COMMERCIAL

## 2025-01-14 VITALS
OXYGEN SATURATION: 98 % | DIASTOLIC BLOOD PRESSURE: 88 MMHG | SYSTOLIC BLOOD PRESSURE: 130 MMHG | BODY MASS INDEX: 51.21 KG/M2 | HEART RATE: 90 BPM | TEMPERATURE: 98 F | WEIGHT: 280 LBS | RESPIRATION RATE: 14 BRPM

## 2025-01-14 DIAGNOSIS — J32.9 CHRONIC SINUSITIS, UNSPECIFIED LOCATION: ICD-10-CM

## 2025-01-14 DIAGNOSIS — N95.1 PERIMENOPAUSAL: ICD-10-CM

## 2025-01-14 DIAGNOSIS — J45.20 MILD INTERMITTENT REACTIVE AIRWAY DISEASE WITHOUT COMPLICATION (HHS-HCC): Primary | ICD-10-CM

## 2025-01-14 DIAGNOSIS — I10 PRIMARY HYPERTENSION: ICD-10-CM

## 2025-01-14 DIAGNOSIS — R79.89 ELEVATED LFTS: ICD-10-CM

## 2025-01-14 PROCEDURE — 3079F DIAST BP 80-89 MM HG: CPT | Performed by: INTERNAL MEDICINE

## 2025-01-14 PROCEDURE — 1036F TOBACCO NON-USER: CPT | Performed by: INTERNAL MEDICINE

## 2025-01-14 PROCEDURE — 3075F SYST BP GE 130 - 139MM HG: CPT | Performed by: INTERNAL MEDICINE

## 2025-01-14 PROCEDURE — 99214 OFFICE O/P EST MOD 30 MIN: CPT | Performed by: INTERNAL MEDICINE

## 2025-01-14 RX ORDER — ALBUTEROL SULFATE AND BUDESONIDE 90; 80 UG/1; UG/1
2 AEROSOL, METERED RESPIRATORY (INHALATION) EVERY 6 HOURS PRN
Qty: 10.7 G | Refills: 2 | Status: SHIPPED | OUTPATIENT
Start: 2025-01-14

## 2025-01-14 NOTE — PROGRESS NOTES
Subjective    Eli Rockwell is a 49 y.o. female who presents for Cough.  HPI    Persistent cough since beginning of December. Spastic.   When she talks or laughs  Albuterol helps  She has pnd . Clears her throat. Does not feel sick. No fever. No colored sputum.  She is not coughing out anything  She has irregular periods and is wondering about HRT      Review of Systems   All other systems reviewed and are negative.        Objective     /88 (BP Location: Left arm, Patient Position: Sitting, BP Cuff Size: Adult)   Pulse 90   Temp 36.7 °C (98 °F) (Skin)   Resp 14   Wt 127 kg (280 lb)   SpO2 98%   BMI 51.21 kg/m²    Physical Exam  Health Maintenance Due   Topic Date Due    Yearly Adult Physical  Never done    HIV Screening  Never done    MMR Vaccines (1 of 1 - Standard series) Never done    Diabetes Screening  Never done    Hepatitis B Vaccines (1 of 3 - 19+ 3-dose series) Never done    Pneumococcal Vaccine: Pediatrics and At-Risk Adult Patients (1 of 2 - PCV) Never done    DTaP/Tdap/Td Vaccines (1 - Tdap) Never done    Cervical Cancer Screening  03/10/2023    Mammogram  09/01/2024    Influenza Vaccine (1) Never done    COVID-19 Vaccine (1 - 2024-25 season) Never done          Assessment/Plan   Problem List Items Addressed This Visit       Chronic sinusitis    HTN (hypertension)    Relevant Orders    CBC    Lipid Panel    Mild intermittent reactive airway disease without complication ([facility]) - Primary    Relevant Medications    albuterol-budesonide (Airsupra) 90-80 mcg/actuation inhaler     Other Visit Diagnoses       Perimenopausal        Elevated LFTs        Relevant Orders    Comprehensive Metabolic Panel    CBC    Lipid Panel        Check labs.   She likely has  RAD perhaps postviral. Use Airspura 2 puffs prn up to 12 puffs  We discussed perimenopause  She does not seem to have sinus infection. But pnd. Can try OTC Claritin.  Call  with any problems or questions.   Follow up for physical.

## 2025-03-05 ENCOUNTER — APPOINTMENT (OUTPATIENT)
Dept: PRIMARY CARE | Facility: CLINIC | Age: 50
End: 2025-03-05
Payer: COMMERCIAL

## 2025-03-05 VITALS
SYSTOLIC BLOOD PRESSURE: 132 MMHG | HEART RATE: 76 BPM | BODY MASS INDEX: 50.24 KG/M2 | WEIGHT: 273 LBS | RESPIRATION RATE: 14 BRPM | DIASTOLIC BLOOD PRESSURE: 84 MMHG | TEMPERATURE: 97.7 F | HEIGHT: 62 IN | OXYGEN SATURATION: 98 %

## 2025-03-05 DIAGNOSIS — Z00.00 WELLNESS EXAMINATION: Primary | ICD-10-CM

## 2025-03-05 DIAGNOSIS — Z12.31 ENCOUNTER FOR SCREENING MAMMOGRAM FOR MALIGNANT NEOPLASM OF BREAST: ICD-10-CM

## 2025-03-05 DIAGNOSIS — E03.9 ACQUIRED HYPOTHYROIDISM: ICD-10-CM

## 2025-03-05 DIAGNOSIS — I10 PRIMARY HYPERTENSION: ICD-10-CM

## 2025-03-05 PROCEDURE — 87624 HPV HI-RISK TYP POOLED RSLT: CPT

## 2025-03-05 NOTE — PROGRESS NOTES
"Subjective    Eli Rockwell is a 49 y.o. female who presents for Annual Exam.  HPI    Colonoscopy 2019  Mammogram 2023  Pap 2020    New allergy- latex- bought a new mattress in December-started with cough.   She is feeling well.  She is starting to have mildly irregular periods.     Review of Systems   All other systems reviewed and are negative.        Objective     /84 (BP Location: Left arm, Patient Position: Sitting, BP Cuff Size: Large adult)   Pulse 76   Temp 36.5 °C (97.7 °F) (Skin)   Resp 14   Ht 1.575 m (5' 2\")   Wt 124 kg (273 lb)   LMP 02/28/2025 (Approximate)   SpO2 98%   BMI 49.93 kg/m²    Physical Exam  HENT:      Head: Normocephalic and atraumatic.      Mouth/Throat:      Mouth: Mucous membranes are moist.   Neck:      Thyroid: No thyroid mass or thyromegaly.   Cardiovascular:      Rate and Rhythm: Normal rate and regular rhythm.      Pulses: Normal pulses.      Heart sounds: Normal heart sounds.   Pulmonary:      Effort: Pulmonary effort is normal.      Breath sounds: Normal breath sounds.   Chest:   Breasts:     Right: Normal. No mass, nipple discharge or skin change.      Left: No mass, nipple discharge or skin change.   Abdominal:      General: Abdomen is flat. Bowel sounds are normal.      Palpations: Abdomen is soft.   Genitourinary:     General: Normal vulva.      Vagina: Normal.      Cervix: Normal.      Uterus: Normal.       Adnexa: Right adnexa normal and left adnexa normal.   Musculoskeletal:      Cervical back: Normal range of motion and neck supple.   Skin:     General: Skin is warm and dry.   Neurological:      General: No focal deficit present.   Psychiatric:         Mood and Affect: Mood normal.       Health Maintenance Due   Topic Date Due    Yearly Adult Physical  Never done    HIV Screening  Never done    Diabetes Screening  Never done    Hepatitis B Vaccines (1 of 3 - 19+ 3-dose series) 05/02/1994    Pneumococcal Vaccine: Pediatrics and At-Risk Adult Patients (1 of " 2 - PCV) Never done    Cervical Cancer Screening  03/10/2023    Mammogram  09/01/2024    Influenza Vaccine (1) Never done    COVID-19 Vaccine (1 - 2024-25 season) Never done          Assessment/Plan   Problem List Items Addressed This Visit       HTN (hypertension)    Hypothyroid    Relevant Orders    TSH with reflex to Free T4 if abnormal     Other Visit Diagnoses       Wellness examination    -  Primary    Relevant Orders    THINPREP PAP TEST    Encounter for screening mammogram for malignant neoplasm of breast        Relevant Orders    BI mammo bilateral screening tomosynthesis        Check labs from last month and tsh  Mamm ordered.   Check pap.   Blood pressure is good.   Call  with any problems or questions.   Follow up in 6 months

## 2025-03-20 LAB
ALBUMIN SERPL-MCNC: 4 G/DL (ref 3.6–5.1)
ALP SERPL-CCNC: 65 U/L (ref 31–125)
ALT SERPL-CCNC: 62 U/L (ref 6–29)
ANION GAP SERPL CALCULATED.4IONS-SCNC: 9 MMOL/L (CALC) (ref 7–17)
AST SERPL-CCNC: 52 U/L (ref 10–35)
BILIRUB SERPL-MCNC: 0.5 MG/DL (ref 0.2–1.2)
BUN SERPL-MCNC: 8 MG/DL (ref 7–25)
CALCIUM SERPL-MCNC: 8.8 MG/DL (ref 8.6–10.2)
CHLORIDE SERPL-SCNC: 105 MMOL/L (ref 98–110)
CHOLEST SERPL-MCNC: 147 MG/DL
CHOLEST/HDLC SERPL: 4.9 (CALC)
CO2 SERPL-SCNC: 25 MMOL/L (ref 20–32)
CREAT SERPL-MCNC: 0.73 MG/DL (ref 0.5–0.99)
EGFRCR SERPLBLD CKD-EPI 2021: 101 ML/MIN/1.73M2
ERYTHROCYTE [DISTWIDTH] IN BLOOD BY AUTOMATED COUNT: 15.9 % (ref 11–15)
GLUCOSE SERPL-MCNC: 97 MG/DL (ref 65–99)
HCT VFR BLD AUTO: 35.6 % (ref 35–45)
HDLC SERPL-MCNC: 30 MG/DL
HGB BLD-MCNC: 10.9 G/DL (ref 11.7–15.5)
LDLC SERPL CALC-MCNC: 89 MG/DL (CALC)
MCH RBC QN AUTO: 24.3 PG (ref 27–33)
MCHC RBC AUTO-ENTMCNC: 30.6 G/DL (ref 32–36)
MCV RBC AUTO: 79.3 FL (ref 80–100)
NONHDLC SERPL-MCNC: 117 MG/DL (CALC)
PLATELET # BLD AUTO: 316 THOUSAND/UL (ref 140–400)
PMV BLD REES-ECKER: 11.8 FL (ref 7.5–12.5)
POTASSIUM SERPL-SCNC: 4 MMOL/L (ref 3.5–5.3)
PROT SERPL-MCNC: 6.3 G/DL (ref 6.1–8.1)
RBC # BLD AUTO: 4.49 MILLION/UL (ref 3.8–5.1)
SODIUM SERPL-SCNC: 139 MMOL/L (ref 135–146)
TRIGL SERPL-MCNC: 183 MG/DL
TSH SERPL-ACNC: 2.02 MIU/L
WBC # BLD AUTO: 7.4 THOUSAND/UL (ref 3.8–10.8)

## 2025-03-25 ENCOUNTER — TELEPHONE (OUTPATIENT)
Dept: PRIMARY CARE | Facility: CLINIC | Age: 50
End: 2025-03-25
Payer: COMMERCIAL

## 2025-03-25 NOTE — TELEPHONE ENCOUNTER
----- Message from Nitza Reynoso sent at 3/24/2025  5:21 PM EDT -----  Her labs shows elevated liver enzymes and  anemia.  Would recommend she decrease sugar and processed carbs.   Is she taking iron three times a day and if so how long has she been on it.

## 2025-04-21 DIAGNOSIS — K21.9 GASTROESOPHAGEAL REFLUX DISEASE, UNSPECIFIED WHETHER ESOPHAGITIS PRESENT: ICD-10-CM

## 2025-04-21 DIAGNOSIS — E06.3 HASHIMOTO'S THYROIDITIS: ICD-10-CM

## 2025-04-21 RX ORDER — LEVOTHYROXINE SODIUM 75 UG/1
75 TABLET ORAL DAILY
Qty: 90 TABLET | Refills: 3 | Status: SHIPPED | OUTPATIENT
Start: 2025-04-21 | End: 2026-04-21

## 2025-04-21 RX ORDER — PANTOPRAZOLE SODIUM 20 MG/1
20 TABLET, DELAYED RELEASE ORAL
Qty: 90 TABLET | Refills: 3 | Status: SHIPPED | OUTPATIENT
Start: 2025-04-21 | End: 2026-04-21

## 2025-04-25 ENCOUNTER — TELEPHONE (OUTPATIENT)
Dept: PRIMARY CARE | Facility: CLINIC | Age: 50
End: 2025-04-25
Payer: COMMERCIAL

## 2025-04-25 DIAGNOSIS — N92.6 IRREGULAR PERIODS: Primary | ICD-10-CM

## 2025-04-25 NOTE — TELEPHONE ENCOUNTER
"Pt called request \"a GYN you would recommend\"  Please advise    Pt aware Dr is out of office for the rest of day.  "

## 2025-05-07 DIAGNOSIS — I10 PRIMARY HYPERTENSION: ICD-10-CM

## 2025-05-07 RX ORDER — AMLODIPINE BESYLATE 5 MG/1
5 TABLET ORAL DAILY
Qty: 90 TABLET | Refills: 3 | Status: SHIPPED | OUTPATIENT
Start: 2025-05-07 | End: 2026-05-07

## 2025-05-12 ENCOUNTER — TELEPHONE (OUTPATIENT)
Dept: PRIMARY CARE | Facility: CLINIC | Age: 50
End: 2025-05-12
Payer: COMMERCIAL

## 2025-05-12 DIAGNOSIS — K64.9 HEMORRHOIDS, UNSPECIFIED HEMORRHOID TYPE: Primary | ICD-10-CM

## 2025-05-12 DIAGNOSIS — I10 PRIMARY HYPERTENSION: ICD-10-CM

## 2025-05-12 RX ORDER — AMLODIPINE BESYLATE 5 MG/1
5 TABLET ORAL DAILY
Qty: 90 TABLET | Refills: 3 | Status: SHIPPED | OUTPATIENT
Start: 2025-05-12 | End: 2026-05-12

## 2025-05-20 ENCOUNTER — OFFICE VISIT (OUTPATIENT)
Dept: GASTROENTEROLOGY | Facility: CLINIC | Age: 50
End: 2025-05-20
Payer: COMMERCIAL

## 2025-05-20 ENCOUNTER — APPOINTMENT (OUTPATIENT)
Dept: OBSTETRICS AND GYNECOLOGY | Facility: CLINIC | Age: 50
End: 2025-05-20
Payer: COMMERCIAL

## 2025-05-20 VITALS
WEIGHT: 276 LBS | SYSTOLIC BLOOD PRESSURE: 132 MMHG | HEIGHT: 62 IN | HEART RATE: 79 BPM | OXYGEN SATURATION: 96 % | DIASTOLIC BLOOD PRESSURE: 80 MMHG | BODY MASS INDEX: 50.79 KG/M2

## 2025-05-20 DIAGNOSIS — R10.13 EPIGASTRIC PAIN: Primary | ICD-10-CM

## 2025-05-20 PROCEDURE — 3008F BODY MASS INDEX DOCD: CPT | Performed by: INTERNAL MEDICINE

## 2025-05-20 PROCEDURE — 1036F TOBACCO NON-USER: CPT | Performed by: INTERNAL MEDICINE

## 2025-05-20 PROCEDURE — 99214 OFFICE O/P EST MOD 30 MIN: CPT | Performed by: INTERNAL MEDICINE

## 2025-05-20 PROCEDURE — 3075F SYST BP GE 130 - 139MM HG: CPT | Performed by: INTERNAL MEDICINE

## 2025-05-20 PROCEDURE — 3079F DIAST BP 80-89 MM HG: CPT | Performed by: INTERNAL MEDICINE

## 2025-05-20 NOTE — PROGRESS NOTES
Gastroenterology Office Visit     History of Present Illness:   Eli Rockwell is a 50 y.o. female who presents to GI clinic for follow up of epigastric pain and rectal bleeding.  Since last OV in 11/22 (saw Liz Mar), patient has had an episode of epigastric pain 2 weeks ago.  She ate dinner that night around 6-7 PM.  The pain started around midnight.  It radiated to the left flank around to the back.  She wonders if it is her pancreas.  It lasted 2 days.  There was no associated nausea or vomiting.    She reports being anemic for many years.  She has been taking iron for this.  She has had heavy menstrual periods for most of her life.  She is still having them at age 50.  She bleeds heavily for 3-4 days.  She was tried on OCPs earlier in life, but cannot tolerate it due to nausea and vomiting.    She reports having outlet type rectal bleeding.  She sees bright red blood on the toilet paper.    She reports having IBS for 20+ years.  This was mostly diarrhea in the past, but now diarrhea alternates with constipation.  On her worst days with the diarrhea, she has 4 loose stools in an hour.  She may have some nocturnal symptoms.  Now she goes 2-5 days without bowel movements at times.  These are associated with lower abdominal cramps.  The epigastric pain is different than her IBS pain.    Wt stable.     OV with Liz Mar 11/22:  48 yo female here with h/o GERD, IBS, HTN, morbid obesity, iron def anemia, hypothyroidism - referred for globus and possible EGD. Seen in the ER on 11/22 with B/L flank pain and low back pain. Normal labs and normal CT ABD. denies NSAID use. Does not take PPI. Usually takes iron once a month when she is on her menstrual cycle. Has a history of menorrhagia.     >> CT ABD- 11/22/22 - small amount of stool scattered throughout the colon, suggesting mild constipation. Hepatomegaly and diffuse fatty liver infiltration.   >> EGD - 9/2019 - Dr Pisano - normal  >> Colonoscopy -9/2019 -Dr Pisano -  "non-bleeding internal hemorrhoids. Repeat in 10 years.   >>LABS- 11/22 - Hgb - 11.2, CMP, Lipase - normal      frequent nausea for a few weeks - random -improving   reports difficulty swallowing - \"feeling of something stuck in my throat all the time\"   denies reflux, esophageal dysphagia or emesis   +early satiety, bloating frequently and lower abd pain/discomfort   moves bowels every 2-3 days - \"unpredictable\" - \"sometimes I will be up all night\" moving her bowels   Denies hematochezia or melena  was eating meat, now vegan - eating a lot of pasta, bread and rice   Eats tomato sauce, drinks some caffeine eats fried food     PMH - as above   PSH - TL   Fam Hx- no fam h/o colon CA, IBD or Celiac   Soc Hx -never smoker, denies alcohol use, denies illicit drug use    Review of Systems  Constitutional: denies fever/ chills, night sweats, wt loss and fatigue  Respiratory: denies SOB, RAMIREZ  CV: denies chest pain and LE edema  Neuro: denies weakness and difficulty walking      Past Medical History   has a past medical history of Palpitation (05/23/2023).     Problem List  Problem List[1]    Past Surgical History  Surgical History[2]    Social History   reports that she has never smoked. She has never used smokeless tobacco. She reports that she does not currently use alcohol. She reports that she does not currently use drugs.     Family History  family history is not on file.       Allergies  RX Allergies[3]    Medications  Current Outpatient Medications   Medication Instructions    albuterol-budesonide (Airsupra) 90-80 mcg/actuation inhaler 2 puffs, inhalation, Every 6 hours PRN    amLODIPine (NORVASC) 5 mg, oral, Daily    famotidine (PEPCID) 20 mg, As needed    ferrous fumarate-vitamin C (Ashley-Sequeles 65-25) 1 tablet, 3 times daily (morning, midday, late afternoon)    levothyroxine (SYNTHROID, LEVOXYL) 75 mcg, oral, Daily    lidocaine (Hemorrhoidal Relief) 5 % cream cream 1 Application, Topical, Every 6 hours PRN    " "pantoprazole (PROTONIX) 20 mg, oral, Daily before breakfast, Do not crush, chew, or split.        Objective   Blood pressure 132/80, pulse 79, height 1.575 m (5' 2\"), weight 125 kg (276 lb), SpO2 96%.      General: no acute distress, well-nourished  Skin: no jaundice, rash or liver stigmata  HEENT: no icterus/ eye redness, no oral lesions; Mallampati 3  Respiratory: normal breath sounds bilaterally, no wheezes or rales  CV: RRR, no murmurs; no LE edema  Abd: soft, nt    LABS  Component      Latest Ref Rng 3/20/2025   WHITE BLOOD CELL COUNT      3.8 - 10.8 Thousand/uL 7.4    RED BLOOD CELL COUNT      3.80 - 5.10 Million/uL 4.49    HEMOGLOBIN      11.7 - 15.5 g/dL 10.9 (L)    HEMATOCRIT      35.0 - 45.0 % 35.6    MCV      80.0 - 100.0 fL 79.3 (L)    MCH      27.0 - 33.0 pg 24.3 (L)    MCHC      32.0 - 36.0 g/dL 30.6 (L)    RDW      11.0 - 15.0 % 15.9 (H)    PLATELET COUNT      140 - 400 Thousand/uL 316       Component      Latest Ref Rng 3/20/2025   PROTEIN, TOTAL      6.1 - 8.1 g/dL 6.3    ALBUMIN      3.6 - 5.1 g/dL 4.0    BILIRUBIN, TOTAL      0.2 - 1.2 mg/dL 0.5    ALKALINE PHOSPHATASE      31 - 125 U/L 65    AST      10 - 35 U/L 52 (H)    ALT      6 - 29 U/L 62 (H)       Component      Latest Ref Rng 8/17/2023 5/9/2024   IRON      35 - 150 ug/dL 41  56    UIBC      110 - 370 ug/dL  366    TIBC      240 - 445 ug/dL 437  422    % Saturation      25 - 45 % 9 (L)  13 (L)    Vitamin B12      211 - 911 pg/mL 404     FERRITIN      8 - 150 ug/L 24        Ferritin 11 in 5/19    Radiology  As above    Endoscopy    As above    9/19 Bx of stomach, duodenum, R/L colon all normal    Assessment/Plan   Eli Rockwell is a 50 y.o. female who presents to GI clinic for epigastric pain.  Biliary colic/cholecystitis vs PUD.        Epigastric pain  Call [phone] to schedule US.  Schedule EGD at [facility].  Return to clinic in 2 months.      CHONG- chronic, thought due to menstrual losses in past.  Was on OCPs in the past, did not " tolerate    BMI 50    IBS, mixed- will address next visit. Duodenal and colonic bx were negative for celiac and microscopic colitis in 2019.     Outlet bleeding- known hemorrhoids on last colonoscopy    NAFLD    Taras Chambers MD            [1]   Patient Active Problem List  Diagnosis    Allergic sinusitis    Chronic sinusitis    Disorder of nail    Gastroesophageal reflux disease    HTN (hypertension)    Hypothyroid    Lower back pain    Seasonal allergies    KUNAL (obstructive sleep apnea)    Never smoked any substance    Mild intermittent reactive airway disease without complication ([facility])    Globus sensation    DNS (deviated nasal septum)    Internal hemorrhoids    Epigastric pain   [2]   Past Surgical History:  Procedure Laterality Date    OTHER SURGICAL HISTORY  03/10/2020    Tubal ligation   [3]   Allergies  Allergen Reactions    Latex Cough    Penicillins Hives and Itching    Adhesive Tape-Silicones Rash

## 2025-05-20 NOTE — H&P (VIEW-ONLY)
Gastroenterology Office Visit     History of Present Illness:   Eli Rockwell is a 50 y.o. female who presents to GI clinic for follow up of epigastric pain and rectal bleeding.  Since last OV in 11/22 (saw Liz Mar), patient has had an episode of epigastric pain 2 weeks ago.  She ate dinner that night around 6-7 PM.  The pain started around midnight.  It radiated to the left flank around to the back.  She wonders if it is her pancreas.  It lasted 2 days.  There was no associated nausea or vomiting.    She reports being anemic for many years.  She has been taking iron for this.  She has had heavy menstrual periods for most of her life.  She is still having them at age 50.  She bleeds heavily for 3-4 days.  She was tried on OCPs earlier in life, but cannot tolerate it due to nausea and vomiting.    She reports having outlet type rectal bleeding.  She sees bright red blood on the toilet paper.    She reports having IBS for 20+ years.  This was mostly diarrhea in the past, but now diarrhea alternates with constipation.  On her worst days with the diarrhea, she has 4 loose stools in an hour.  She may have some nocturnal symptoms.  Now she goes 2-5 days without bowel movements at times.  These are associated with lower abdominal cramps.  The epigastric pain is different than her IBS pain.    Wt stable.     OV with Liz Mar 11/22:  46 yo female here with h/o GERD, IBS, HTN, morbid obesity, iron def anemia, hypothyroidism - referred for globus and possible EGD. Seen in the ER on 11/22 with B/L flank pain and low back pain. Normal labs and normal CT ABD. denies NSAID use. Does not take PPI. Usually takes iron once a month when she is on her menstrual cycle. Has a history of menorrhagia.     >> CT ABD- 11/22/22 - small amount of stool scattered throughout the colon, suggesting mild constipation. Hepatomegaly and diffuse fatty liver infiltration.   >> EGD - 9/2019 - Dr Pisano - normal  >> Colonoscopy -9/2019 -Dr Pisano -  "non-bleeding internal hemorrhoids. Repeat in 10 years.   >>LABS- 11/22 - Hgb - 11.2, CMP, Lipase - normal      frequent nausea for a few weeks - random -improving   reports difficulty swallowing - \"feeling of something stuck in my throat all the time\"   denies reflux, esophageal dysphagia or emesis   +early satiety, bloating frequently and lower abd pain/discomfort   moves bowels every 2-3 days - \"unpredictable\" - \"sometimes I will be up all night\" moving her bowels   Denies hematochezia or melena  was eating meat, now vegan - eating a lot of pasta, bread and rice   Eats tomato sauce, drinks some caffeine eats fried food     PMH - as above   PSH - TL   Fam Hx- no fam h/o colon CA, IBD or Celiac   Soc Hx -never smoker, denies alcohol use, denies illicit drug use    Review of Systems  Constitutional: denies fever/ chills, night sweats, wt loss and fatigue  Respiratory: denies SOB, RAMIREZ  CV: denies chest pain and LE edema  Neuro: denies weakness and difficulty walking      Past Medical History   has a past medical history of Palpitation (05/23/2023).     Problem List  Problem List[1]    Past Surgical History  Surgical History[2]    Social History   reports that she has never smoked. She has never used smokeless tobacco. She reports that she does not currently use alcohol. She reports that she does not currently use drugs.     Family History  family history is not on file.       Allergies  RX Allergies[3]    Medications  Current Outpatient Medications   Medication Instructions    albuterol-budesonide (Airsupra) 90-80 mcg/actuation inhaler 2 puffs, inhalation, Every 6 hours PRN    amLODIPine (NORVASC) 5 mg, oral, Daily    famotidine (PEPCID) 20 mg, As needed    ferrous fumarate-vitamin C (Ashley-Sequeles 65-25) 1 tablet, 3 times daily (morning, midday, late afternoon)    levothyroxine (SYNTHROID, LEVOXYL) 75 mcg, oral, Daily    lidocaine (Hemorrhoidal Relief) 5 % cream cream 1 Application, Topical, Every 6 hours PRN    " "pantoprazole (PROTONIX) 20 mg, oral, Daily before breakfast, Do not crush, chew, or split.        Objective   Blood pressure 132/80, pulse 79, height 1.575 m (5' 2\"), weight 125 kg (276 lb), SpO2 96%.      General: no acute distress, well-nourished  Skin: no jaundice, rash or liver stigmata  HEENT: no icterus/ eye redness, no oral lesions; Mallampati 3  Respiratory: normal breath sounds bilaterally, no wheezes or rales  CV: RRR, no murmurs; no LE edema  Abd: soft, nt    LABS  Component      Latest Ref Rng 3/20/2025   WHITE BLOOD CELL COUNT      3.8 - 10.8 Thousand/uL 7.4    RED BLOOD CELL COUNT      3.80 - 5.10 Million/uL 4.49    HEMOGLOBIN      11.7 - 15.5 g/dL 10.9 (L)    HEMATOCRIT      35.0 - 45.0 % 35.6    MCV      80.0 - 100.0 fL 79.3 (L)    MCH      27.0 - 33.0 pg 24.3 (L)    MCHC      32.0 - 36.0 g/dL 30.6 (L)    RDW      11.0 - 15.0 % 15.9 (H)    PLATELET COUNT      140 - 400 Thousand/uL 316       Component      Latest Ref Rng 3/20/2025   PROTEIN, TOTAL      6.1 - 8.1 g/dL 6.3    ALBUMIN      3.6 - 5.1 g/dL 4.0    BILIRUBIN, TOTAL      0.2 - 1.2 mg/dL 0.5    ALKALINE PHOSPHATASE      31 - 125 U/L 65    AST      10 - 35 U/L 52 (H)    ALT      6 - 29 U/L 62 (H)       Component      Latest Ref Rng 8/17/2023 5/9/2024   IRON      35 - 150 ug/dL 41  56    UIBC      110 - 370 ug/dL  366    TIBC      240 - 445 ug/dL 437  422    % Saturation      25 - 45 % 9 (L)  13 (L)    Vitamin B12      211 - 911 pg/mL 404     FERRITIN      8 - 150 ug/L 24        Ferritin 11 in 5/19    Radiology  As above    Endoscopy    As above    9/19 Bx of stomach, duodenum, R/L colon all normal    Assessment/Plan   Eli Rockwell is a 50 y.o. female who presents to GI clinic for epigastric pain.  Biliary colic/cholecystitis vs PUD.        Epigastric pain  Call [phone] to schedule US.  Schedule EGD at [facility].  Return to clinic in 2 months.      CHONG- chronic, thought due to menstrual losses in past.  Was on OCPs in the past, did not " tolerate    BMI 50    IBS, mixed- will address next visit. Duodenal and colonic bx were negative for celiac and microscopic colitis in 2019.     Outlet bleeding- known hemorrhoids on last colonoscopy    NAFLD    Taras Chambers MD            [1]   Patient Active Problem List  Diagnosis    Allergic sinusitis    Chronic sinusitis    Disorder of nail    Gastroesophageal reflux disease    HTN (hypertension)    Hypothyroid    Lower back pain    Seasonal allergies    KUNAL (obstructive sleep apnea)    Never smoked any substance    Mild intermittent reactive airway disease without complication ([facility]-HCC)    Globus sensation    DNS (deviated nasal septum)    Internal hemorrhoids    Epigastric pain   [2]   Past Surgical History:  Procedure Laterality Date    OTHER SURGICAL HISTORY  03/10/2020    Tubal ligation   [3]   Allergies  Allergen Reactions    Latex Cough    Penicillins Hives and Itching    Adhesive Tape-Silicones Rash

## 2025-05-20 NOTE — ASSESSMENT & PLAN NOTE
Call [phone] to schedule US.  Schedule EGD at [facility].  Return to clinic in 2 months.

## 2025-05-20 NOTE — PATIENT INSTRUCTIONS
Call [phone] to schedule US.  Schedule EGD at [facility].  Return to clinic in 2 months.

## 2025-05-22 ENCOUNTER — CLINICAL SUPPORT (OUTPATIENT)
Dept: PREADMISSION TESTING | Facility: HOSPITAL | Age: 50
End: 2025-05-22
Payer: COMMERCIAL

## 2025-05-22 NOTE — PREPROCEDURE INSTRUCTIONS

## 2025-05-23 LAB
ALBUMIN SERPL-MCNC: 4 G/DL (ref 3.6–5.1)
ALBUMIN/GLOB SERPL: 1.6 (CALC) (ref 1–2.5)
ALP SERPL-CCNC: 81 U/L (ref 37–153)
ALT SERPL-CCNC: 49 U/L (ref 6–29)
AST SERPL-CCNC: 44 U/L (ref 10–35)
BILIRUB DIRECT SERPL-MCNC: 0.1 MG/DL
BILIRUB INDIRECT SERPL-MCNC: 0.3 MG/DL (CALC) (ref 0.2–1.2)
BILIRUB SERPL-MCNC: 0.4 MG/DL (ref 0.2–1.2)
GLOBULIN SER CALC-MCNC: 2.5 G/DL (CALC) (ref 1.9–3.7)
LIPASE SERPL-CCNC: 30 U/L (ref 7–60)
PROT SERPL-MCNC: 6.5 G/DL (ref 6.1–8.1)

## 2025-05-27 ENCOUNTER — HOSPITAL ENCOUNTER (OUTPATIENT)
Dept: RADIOLOGY | Facility: CLINIC | Age: 50
Discharge: HOME | End: 2025-05-27
Payer: COMMERCIAL

## 2025-05-27 ENCOUNTER — ANESTHESIA EVENT (OUTPATIENT)
Dept: GASTROENTEROLOGY | Facility: HOSPITAL | Age: 50
End: 2025-05-27
Payer: COMMERCIAL

## 2025-05-27 DIAGNOSIS — R10.13 EPIGASTRIC PAIN: ICD-10-CM

## 2025-05-27 PROBLEM — E66.813 CLASS 3 SEVERE OBESITY IN ADULT: Status: ACTIVE | Noted: 2025-05-27

## 2025-05-27 PROCEDURE — 76705 ECHO EXAM OF ABDOMEN: CPT

## 2025-05-27 PROCEDURE — 76705 ECHO EXAM OF ABDOMEN: CPT | Performed by: RADIOLOGY

## 2025-05-27 NOTE — ANESTHESIA PREPROCEDURE EVALUATION
Eli Rockwell is a 50 y.o. female here for:    Esophagogastroduodenoscopy (EGD)  With Taras Chambers MD  Epigastric pain    Relevant Problems   Cardiac  Echo 2023:  CONCLUSIONS:  1. Left ventricular systolic function is normal with a 60-65% estimated ejection fraction.  2. Intact intraventricular septum without shunting or a ventricular septal defect.  3. No left ventricular thrombus visualized.  4. There is no evidence of left ventricular hypertrophy.  5. No evidence of mitral valve prolapse.  6. There is No tricuspid stenosis.  7. Aortic valve stenosis is not present.  8. Compared with echo 1/2016 there is no significant change.     (+) HTN (hypertension)      Pulmonary   (+) Mild intermittent reactive airway disease without complication (HHS-HCC)   (+) KUNAL (obstructive sleep apnea)      GI   (+) Gastroesophageal reflux disease      Endocrine   (+) Class 3 severe obesity in adult   (+) Hypothyroid      HEENT   (+) Allergic sinusitis   (+) Chronic sinusitis   (+) Seasonal allergies       Lab Results   Component Value Date    HGB 10.9 (L) 03/20/2025    HCT 35.6 03/20/2025    WBC 7.4 03/20/2025     03/20/2025     03/20/2025    K 4.0 03/20/2025     03/20/2025    CREATININE 0.73 03/20/2025    BUN 8 03/20/2025       Tobacco Use History[1]    RX Allergies[2]    Current Outpatient Medications   Medication Instructions    albuterol-budesonide (Airsupra) 90-80 mcg/actuation inhaler 2 puffs, inhalation, Every 6 hours PRN    amLODIPine (NORVASC) 5 mg, oral, Daily    famotidine (PEPCID) 20 mg, As needed    ferrous fumarate-vitamin C (Ashley-[place] 65-25) 1 tablet, 3 times daily (morning, midday, late afternoon)    levothyroxine (SYNTHROID, LEVOXYL) 75 mcg, oral, Daily    lidocaine (Hemorrhoidal Relief) 5 % cream cream 1 Application, Topical, Every 6 hours PRN    pantoprazole (PROTONIX) 20 mg, oral, Daily before breakfast, Do not crush, chew, or split.       Surgical History[3]    Family History[4]    NPO  Details:  No data recorded    Physical Exam  Airway  Mallampati: III  TM distance: >3 FB  Neck ROM: full  Mouth opening: >= 4 cm    Cardiovascular - normal exam  Dental - normal exam  Pulmonary - normal exam  Neurological   Abdominal         Anesthesia Plan    History of general anesthesia?: yes  History of complications of general anesthesia?: no    ASA 3     MAC     The patient is not a current smoker.    intravenous induction   Anesthetic plan and risks discussed with patient.    Plan discussed with CRNA.             [1]   Social History  Tobacco Use   Smoking Status Never   Smokeless Tobacco Never   [2]   Allergies  Allergen Reactions    Adhesive Tape-Silicones Rash    Latex Cough    Penicillins Hives and Itching   [3]   Past Surgical History:  Procedure Laterality Date    COLONOSCOPY      TUBAL LIGATION      UPPER GASTROINTESTINAL ENDOSCOPY      WISDOM TOOTH EXTRACTION     [4] No family history on file.

## 2025-05-28 ENCOUNTER — HOSPITAL ENCOUNTER (OUTPATIENT)
Dept: GASTROENTEROLOGY | Facility: HOSPITAL | Age: 50
Discharge: HOME | End: 2025-05-28
Payer: COMMERCIAL

## 2025-05-28 ENCOUNTER — ANESTHESIA (OUTPATIENT)
Dept: GASTROENTEROLOGY | Facility: HOSPITAL | Age: 50
End: 2025-05-28
Payer: COMMERCIAL

## 2025-05-28 VITALS
RESPIRATION RATE: 18 BRPM | BODY MASS INDEX: 50.27 KG/M2 | TEMPERATURE: 97.2 F | HEIGHT: 62 IN | OXYGEN SATURATION: 99 % | HEART RATE: 66 BPM | WEIGHT: 273.15 LBS | SYSTOLIC BLOOD PRESSURE: 135 MMHG | DIASTOLIC BLOOD PRESSURE: 66 MMHG

## 2025-05-28 DIAGNOSIS — R10.13 EPIGASTRIC PAIN: ICD-10-CM

## 2025-05-28 LAB — PREGNANCY TEST URINE, POC: NEGATIVE

## 2025-05-28 PROCEDURE — 7100000010 HC PHASE TWO TIME - EACH INCREMENTAL 1 MINUTE

## 2025-05-28 PROCEDURE — 81025 URINE PREGNANCY TEST: CPT | Performed by: STUDENT IN AN ORGANIZED HEALTH CARE EDUCATION/TRAINING PROGRAM

## 2025-05-28 PROCEDURE — 3700000002 HC GENERAL ANESTHESIA TIME - EACH INCREMENTAL 1 MINUTE

## 2025-05-28 PROCEDURE — 3700000001 HC GENERAL ANESTHESIA TIME - INITIAL BASE CHARGE

## 2025-05-28 PROCEDURE — 2500000004 HC RX 250 GENERAL PHARMACY W/ HCPCS (ALT 636 FOR OP/ED): Mod: JZ | Performed by: NURSE ANESTHETIST, CERTIFIED REGISTERED

## 2025-05-28 PROCEDURE — 43239 EGD BIOPSY SINGLE/MULTIPLE: CPT | Performed by: INTERNAL MEDICINE

## 2025-05-28 PROCEDURE — 7100000009 HC PHASE TWO TIME - INITIAL BASE CHARGE

## 2025-05-28 RX ORDER — PROPOFOL 10 MG/ML
INJECTION, EMULSION INTRAVENOUS AS NEEDED
Status: DISCONTINUED | OUTPATIENT
Start: 2025-05-28 | End: 2025-05-28

## 2025-05-28 RX ORDER — SODIUM CHLORIDE, SODIUM LACTATE, POTASSIUM CHLORIDE, CALCIUM CHLORIDE 600; 310; 30; 20 MG/100ML; MG/100ML; MG/100ML; MG/100ML
INJECTION, SOLUTION INTRAVENOUS CONTINUOUS PRN
Status: DISCONTINUED | OUTPATIENT
Start: 2025-05-28 | End: 2025-05-28

## 2025-05-28 RX ADMIN — PROPOFOL 200 MG: 10 INJECTION, EMULSION INTRAVENOUS at 07:41

## 2025-05-28 RX ADMIN — PROPOFOL 200 MG: 10 INJECTION, EMULSION INTRAVENOUS at 07:43

## 2025-05-28 RX ADMIN — PROPOFOL 200 MG: 10 INJECTION, EMULSION INTRAVENOUS at 07:36

## 2025-05-28 RX ADMIN — SODIUM CHLORIDE, POTASSIUM CHLORIDE, SODIUM LACTATE AND CALCIUM CHLORIDE: 600; 310; 30; 20 INJECTION, SOLUTION INTRAVENOUS at 07:30

## 2025-05-28 SDOH — HEALTH STABILITY: MENTAL HEALTH: CURRENT SMOKER: 0

## 2025-05-28 ASSESSMENT — PAIN SCALES - GENERAL
PAIN_LEVEL: 0
PAINLEVEL_OUTOF10: 0 - NO PAIN

## 2025-05-28 ASSESSMENT — COLUMBIA-SUICIDE SEVERITY RATING SCALE - C-SSRS
6. HAVE YOU EVER DONE ANYTHING, STARTED TO DO ANYTHING, OR PREPARED TO DO ANYTHING TO END YOUR LIFE?: NO
1. IN THE PAST MONTH, HAVE YOU WISHED YOU WERE DEAD OR WISHED YOU COULD GO TO SLEEP AND NOT WAKE UP?: NO
2. HAVE YOU ACTUALLY HAD ANY THOUGHTS OF KILLING YOURSELF?: NO

## 2025-05-28 ASSESSMENT — PAIN - FUNCTIONAL ASSESSMENT
PAIN_FUNCTIONAL_ASSESSMENT: 0-10

## 2025-05-28 NOTE — DISCHARGE INSTRUCTIONS
Upper GI Endoscopy Discharge Instructions    About this topic  This procedure is done to view your upper gastrointestinal (GI) tract. This includes your throat and food pipe (esophagus). It also includes your stomach and the first part of the small bowel. Some people have this test for problems like coughing or throwing up blood. Other people may be having bad belly pain or blood in their stool. You may be having trouble swallowing or problems with acid reflux.  Doctors often use this test to look for problems like:  Ulcers  Cancer or tumor growths  Internal bleeding  Swelling  Inflammation  Infection  Ernandez's esophagus  Gastroesophageal reflux disease or GERD  Swallowing problems    What care is needed at home?  Ask your doctor what you need to do when you go home. Make sure you ask questions if you do not understand what the doctor says. This way you will know what you need to do.  Your throat may feel sore. Your belly may also feel bloated. Your doctor may give you drugs to help with pain.  Talk to your doctor about when it is safe for you to go back to eating your normal diet and taking your drugs. You can drink fluid once the numbing drugs in your throat wear off.  What follow-up care is needed?  Your doctor may ask you to make visits to the office to check on your progress. Be sure to keep these visits.  The results of this test may help your doctor understand what kind of problem you have with your upper GI tract. Together you can make a plan for more care.  What drugs may be needed?  The doctor may order drugs to:  Help with pain  Decrease the acid in your stomach  Will physical activity be limited?  You may need to limit your activity for the rest of the day. After that, you will likely be able to go back to your normal activities.  What changes to diet are needed?  Soft foods like pudding or soups may be easier to eat at first. Ask your doctor if you need to make any changes to your diet.  What  "problems could happen?  Painful swallowing  Upset stomach  Injury to food pipe  Throwing up  Tear in the esophagus  When do I need to call the doctor?  Signs of infection. These include a fever of 100.4°F (38°C) or higher, chills.  Very bad belly pain  Throwing up blood  Your belly is hard and swollen  Trouble swallowing or breathing  Upset stomach and throwing up  Bloody or black tarry stools  Cough, shortness of breath, or chest pain  A crunching feeling under the skin in your neck  You are not feeling better in 2 to 3 days or you are feeling worse  Teach Back: Helping You Understand  The Teach Back Method helps you understand the information we are giving you. After you talk with the staff, tell them in your own words what you learned. This helps to make sure the staff has described each thing clearly. It also helps to explain things that may have been confusing. Before going home, make sure you can do these:  I can tell you about my procedure.  I can tell you what changes I need to make with my diet or drugs.  I can tell you what I will do if I have very bad belly pain, throwing up blood, or my belly is hard and swollen.  Where can I learn more?  [employer]  https://www.gastro.org/practice-guidance/gi-patient-center/topic/upper-gi-endoscopy  Last Reviewed Date    SURGEON'S PHONE LIST PROVIDED TO PATIENTGastritis - Discharge instructions    The Basics  Written by the doctors and editors at [facility]  What are discharge instructions? -- Discharge instructions are information about how to take care of yourself after getting medical care for a health problem.  What is gastritis? -- \"Gastritis\" means inflammation of the stomach lining (figure 1).  Some people have gastritis that starts suddenly and lasts only for a short time. Doctors call this \"acute\" gastritis. Other people have gastritis that lasts for months or years. Doctors call this \"chronic\" gastritis.  How do I care for myself at " "home? -- Ask the doctor or nurse what you should do when you go home. Make sure that you understand exactly what you need to do to care for yourself. Ask questions if there is anything you do not understand.  You should also:  ? Take all of your medicines as instructed, even if you are feeling better.  ? Avoid taking medicines called \"NSAIDs\" too often, unless your doctor tells you that it is OK. These medicines can cause ulcers, which some people have along with gastritis. Examples of NSAIDs include aspirin, ibuprofen (sample brand names: Advil, Motrin), and naproxen (sample brand names: Aleve, Naprosyn).  ? Eat small meals more often to help with belly pain.  ? Avoid foods that make your symptoms worse - Write down what you ate and drank before you had symptoms. This will help you learn which foods cause you problems. For some people, these include coffee, chocolate, alcohol, peppermint, and fatty foods.  ? Avoid or limit alcohol. This includes beer, wine, and mixed drinks.  ? Quit smoking, if you smoke. Your doctor or nurse can help.  What follow-up care do I need? -- Your doctor or nurse will tell you if you need to make a follow-up appointment. If so, make sure that you know when and where to go.  When should I call the doctor? -- Call for emergency help right away (in the [place] and [place], call 9-1-1) if:  ? You start vomiting blood, or see a lot of blood in your bowel movements.  ? Your belly pain becomes much worse all of a sudden or over a few hours.  ? Your belly becomes hard or tender.  ? You have chest pain or trouble breathing.  Call your doctor for advice if:  ? Your belly pain does not get better even after taking medicine, changing your diet, and following treatment instructions.  ? You lose weight without trying.  ? You are vomiting.  All topics are updated as new evidence becomes available and our peer review process is complete.  This topic retrieved from [employer] on: May 30, 2024.  Topic 855112 " Version 2.0  Release: 32.5.3 - C32.150  © 2024 UpToDate, Inc. and/or its affiliates. All rights reserved.  figure 1: Upper digestive tract    Consumer Information Use and Disclaimer  Disclaimer: This generalized information is a limited summary of diagnosis, treatment, and/or medication information. It is not meant to be comprehensive and should be used as a tool to help the user understand and/or assess potential diagnostic and treatment options. It does NOT include all information about conditions, treatments, medications, side effects, or risks that may apply to a specific patient. It is not intended to be medical advice or a substitute for the medical advice, diagnosis, or treatment of a health care provider based on the health care provider's examination and assessment of a patient's specific and unique circumstances. Patients must speak with a health care provider for complete information about their health, medical questions, and treatment options, including any risks or benefits regarding use of medications. This information does not endorse any treatments or medications as safe, effective, or approved for treating a specific patient. UpToDate, Inc. and its affiliates disclaim any warranty or liability relating to this information or the use thereof.The use of this information is governed by the Terms of Use, available at https://www.wolterskluwer.com/en/know/clinical-effectiveness-terms. 2024© UpToDate, Inc. and its affiliates and/or licensors. All rights reserved.  © 2024 UpToDate, Inc. and/or its affiliates. All rights reserved.

## 2025-05-28 NOTE — ANESTHESIA POSTPROCEDURE EVALUATION
Patient: Eli Rockwell    Procedure Summary       Date: 05/28/25 Room / Location: [facility]    Anesthesia Start: 0730 Anesthesia Stop:     Procedure: EGD Diagnosis: Epigastric pain    Scheduled Providers: Taras Chambers MD; Caden Ackerman DO Responsible Provider: Caden Ackerman DO    Anesthesia Type: MAC ASA Status: 3            Anesthesia Type: MAC    Vitals Value Taken Time   /69 05/28/25 07:48   Temp 36.5 05/28/25 07:48   Pulse 69 05/28/25 07:48   Resp 18 05/28/25 07:48   SpO2 100 05/28/25 07:48       Anesthesia Post Evaluation    Patient location during evaluation: bedside  Patient participation: complete - patient participated  Level of consciousness: awake and alert  Pain score: 0  Pain management: adequate  Airway patency: patent  Cardiovascular status: acceptable and stable  Respiratory status: acceptable and room air  Hydration status: acceptable  Postoperative Nausea and Vomiting: none        No notable events documented.

## 2025-05-28 NOTE — Clinical Note
Huddle and Timeout completed together with team. Patient wristband and DULCE information verified.  Anesthesia safety check completed. Patient was A&Ox3

## 2025-05-28 NOTE — Clinical Note
Patient tolerated procedure well. Appears comfortable with no complaints of pain. VS stable. Arousable prior to transport. Patient transported to [facility] via cart.  Report called              . Handoff completed

## 2025-05-28 NOTE — INTERVAL H&P NOTE
H&P reviewed. The patient was examined and there are no changes to the H&P.  ASA 2, Mallampati 3.

## 2025-05-29 ENCOUNTER — APPOINTMENT (OUTPATIENT)
Dept: SURGERY | Facility: CLINIC | Age: 50
End: 2025-05-29
Payer: COMMERCIAL

## 2025-06-10 ENCOUNTER — APPOINTMENT (OUTPATIENT)
Dept: OBSTETRICS AND GYNECOLOGY | Facility: CLINIC | Age: 50
End: 2025-06-10
Payer: COMMERCIAL

## 2025-06-10 ENCOUNTER — APPOINTMENT (OUTPATIENT)
Dept: RADIOLOGY | Facility: CLINIC | Age: 50
End: 2025-06-10
Payer: COMMERCIAL

## 2025-06-10 VITALS
HEART RATE: 99 BPM | HEIGHT: 62 IN | DIASTOLIC BLOOD PRESSURE: 86 MMHG | BODY MASS INDEX: 50.24 KG/M2 | WEIGHT: 273 LBS | SYSTOLIC BLOOD PRESSURE: 147 MMHG

## 2025-06-10 DIAGNOSIS — N92.6 IRREGULAR PERIODS: ICD-10-CM

## 2025-06-10 DIAGNOSIS — Z78.0 MENOPAUSE: ICD-10-CM

## 2025-06-10 DIAGNOSIS — N93.9 ABNORMAL UTERINE BLEEDING (AUB): Primary | ICD-10-CM

## 2025-06-10 PROCEDURE — 3008F BODY MASS INDEX DOCD: CPT

## 2025-06-10 PROCEDURE — 99214 OFFICE O/P EST MOD 30 MIN: CPT

## 2025-06-10 PROCEDURE — 3079F DIAST BP 80-89 MM HG: CPT

## 2025-06-10 PROCEDURE — 1036F TOBACCO NON-USER: CPT

## 2025-06-10 PROCEDURE — 3077F SYST BP >= 140 MM HG: CPT

## 2025-06-10 RX ORDER — NORETHINDRONE ACETATE AND ETHINYL ESTRADIOL .02; 1 MG/1; MG/1
1 TABLET ORAL DAILY
Qty: 21 TABLET | Refills: 3 | Status: SHIPPED | OUTPATIENT
Start: 2025-06-10 | End: 2026-06-10

## 2025-06-10 NOTE — PROGRESS NOTES
Subjective   Patient ID: Eli Rockwell is a 50 y.o. female who presents for Vaginal Bleeding (Bleeding in between cycles.  Had bleeding for a full month.)    50-year-old woman presenting with complaints of AUB, referred Dr. Reynoso.  States that her cycles are monthly about every 35 days, lasting 5-7 days with very heavy flow. In March she did have a Pap smear which was negative.  Then proceeded to have daily bleeding with varying flow from light to heavy throughout April.  She did have cessation of bleeding with return of normal menses in May.  She is experiencing vasomotor symptoms of menopause including brain fog and sleep disturbance also notes mild mood changes. TSH was checked 03/2025 WNL    Also notes left breast pain which appears to be new over the last few months, unsure if this is related to hormone fluctuation. Mammogram is ordered but has not been completed.     EMB completed in 2015 for AUB and overall benign  Medical history: High blood pressure, hypothyroidism on Synthroid previously hyperthyroidism managed by [place], GERD, asthma  Family history: Potential aunt with breast cancer  Social history: No tobacco use      Review of Systems   All other systems reviewed and are negative.      Objective   Physical Exam  Vitals and nursing note reviewed.         Assessment/Plan   Problem List Items Addressed This Visit    None  Visit Diagnoses         Codes      Abnormal uterine bleeding (AUB)    -  Primary N93.9    Relevant Orders    US PELVIS TRANSABDOMINAL WITH TRANSVAGINAL      Irregular periods     N92.6    Relevant Medications    norethindrone ac-eth estradioL (Loestrin 1/20, 21,) 1-20 mg-mcg tablet    Other Relevant Orders    US PELVIS TRANSABDOMINAL WITH TRANSVAGINAL      Menopause     Z78.0    Relevant Medications    norethindrone ac-eth estradioL (Loestrin 1/20, 21,) 1-20 mg-mcg tablet          Discussed with patient she should obtain mammogram for routine screening as well as for complaints of  left breast pain.  Patient would like to consider hormonal support for vasomotor symptoms. Will obtain transvaginal ultrasound due to month of persistent bleeding.  Patient did have EMB in 2015 which was overall benign.  Patient declining EMB today.  Will consider EMB versus hysteroscopy with one of the physicians with the option of endocervical block versus sedation as she did not tolerate well.         Sharonda Arriaga, RANDALL-CNP 06/10/25 10:08 AM

## 2025-06-11 ENCOUNTER — HOSPITAL ENCOUNTER (OUTPATIENT)
Dept: RADIOLOGY | Facility: HOSPITAL | Age: 50
Discharge: HOME | End: 2025-06-11
Payer: COMMERCIAL

## 2025-06-11 ENCOUNTER — APPOINTMENT (OUTPATIENT)
Dept: RADIOLOGY | Facility: CLINIC | Age: 50
End: 2025-06-11
Payer: COMMERCIAL

## 2025-06-11 DIAGNOSIS — N92.6 IRREGULAR PERIODS: ICD-10-CM

## 2025-06-11 DIAGNOSIS — N93.9 ABNORMAL UTERINE BLEEDING (AUB): ICD-10-CM

## 2025-06-11 DIAGNOSIS — Z12.31 ENCOUNTER FOR SCREENING MAMMOGRAM FOR MALIGNANT NEOPLASM OF BREAST: ICD-10-CM

## 2025-06-11 PROCEDURE — 77067 SCR MAMMO BI INCL CAD: CPT

## 2025-06-11 PROCEDURE — 77067 SCR MAMMO BI INCL CAD: CPT | Performed by: RADIOLOGY

## 2025-06-11 PROCEDURE — 77063 BREAST TOMOSYNTHESIS BI: CPT | Performed by: RADIOLOGY

## 2025-06-11 PROCEDURE — 76856 US EXAM PELVIC COMPLETE: CPT

## 2025-06-12 ENCOUNTER — APPOINTMENT (OUTPATIENT)
Dept: RADIOLOGY | Facility: CLINIC | Age: 50
End: 2025-06-12
Payer: COMMERCIAL

## 2025-06-12 LAB
LABORATORY COMMENT REPORT: NORMAL
PATH REPORT.FINAL DX SPEC: NORMAL
PATH REPORT.GROSS SPEC: NORMAL
PATH REPORT.RELEVANT HX SPEC: NORMAL
PATH REPORT.TOTAL CANCER: NORMAL

## 2025-06-13 DIAGNOSIS — R93.89 THICKENED ENDOMETRIUM: Primary | ICD-10-CM

## 2025-07-22 ENCOUNTER — APPOINTMENT (OUTPATIENT)
Dept: OTOLARYNGOLOGY | Facility: CLINIC | Age: 50
End: 2025-07-22
Payer: COMMERCIAL

## 2025-07-28 ENCOUNTER — APPOINTMENT (OUTPATIENT)
Dept: OTOLARYNGOLOGY | Facility: CLINIC | Age: 50
End: 2025-07-28
Payer: COMMERCIAL

## 2025-07-28 DIAGNOSIS — J34.2 DEVIATED NASAL SEPTUM: Primary | ICD-10-CM

## 2025-07-28 PROCEDURE — 99214 OFFICE O/P EST MOD 30 MIN: CPT | Performed by: OTOLARYNGOLOGY

## 2025-07-28 NOTE — PROGRESS NOTES
Subjective   Patient ID: Eli Rockwell is a 50 y.o. female who presents for Sleep Apnea.    HPI  The patient returns, being seen for nasal obstruction. She has been experiencing difficulty breathing through her nose, L > R, for about 5 years. Feels that sometimes left nostril is almost completely closed off. Has been prescribed Flonase in the past but does not like putting things up her nose so did not use it.     All remaining head neck inquiry otherwise negative.     Review of Systems   Constitutional: Negative.    HENT: Negative.     Respiratory: Negative.     Cardiovascular: Negative.    Neurological: Negative.      Physical Exam  General appearance: No acute distress. Normal facies. Symmetric facial movement. No gross lesions of the face are noted.  Ears:  The external ear structures appear normal. The ear canals patent and the tympanic membranes are intact without evidence of air-fluid levels, retraction, or congenital defects.    Nose:  Anterior rhinoscopy notes a septum deviated left. Examination is noted for normal healthy mucosal membranes without any evidence of lesions, polyps, or exudate.   Throat/Oral mucosa:  The tongue is normally mobile. There are no lesions on the gingiva, buccal, or oral mucosa. There are no oral cavity masses.  Neck:  The neck is negative for mass lymphadenopathy. The trachea and parotid are clear. The thyroid bed is grossly unremarkable. The salivary gland structures are grossly unremarkable.    Assessment/Plan   Deviated nasal septum. Reassurance provided that deviation is not too bad though obviously with her symptoms it is causing some obstruction. Discussed trying nasal steroid spray vs septoplasty. Patient would like to pursue surgical option at this time. Detailed discussion regarding that procedure including risks, benefits, and alternatives which include change in sense of smell, bleeding, infection, risk that we don't make her breathing completely better. The patient  understands and would like to move forward with surgery and we will get her scheduled accordingly.

## 2025-08-01 ENCOUNTER — TELEPHONE (OUTPATIENT)
Dept: OTOLARYNGOLOGY | Facility: CLINIC | Age: 50
End: 2025-08-01
Payer: COMMERCIAL

## 2025-08-13 ENCOUNTER — HOSPITAL ENCOUNTER (OUTPATIENT)
Dept: RADIOLOGY | Facility: HOSPITAL | Age: 50
Discharge: HOME | End: 2025-08-13
Payer: COMMERCIAL

## 2025-08-13 DIAGNOSIS — R05.9 COUGH, UNSPECIFIED: ICD-10-CM

## 2025-08-13 PROCEDURE — 71046 X-RAY EXAM CHEST 2 VIEWS: CPT | Performed by: RADIOLOGY

## 2025-08-13 PROCEDURE — 71046 X-RAY EXAM CHEST 2 VIEWS: CPT

## 2025-08-18 ENCOUNTER — APPOINTMENT (OUTPATIENT)
Dept: OTOLARYNGOLOGY | Facility: CLINIC | Age: 50
End: 2025-08-18
Payer: COMMERCIAL

## 2025-08-18 DIAGNOSIS — J34.3 HYPERTROPHY OF POSTERIOR END OF INFERIOR TURBINATE: Primary | ICD-10-CM

## 2025-08-18 DIAGNOSIS — J34.3 HYPERTROPHY OF BOTH INFERIOR NASAL TURBINATES: ICD-10-CM

## 2025-08-18 DIAGNOSIS — J34.2 DNS (DEVIATED NASAL SEPTUM): ICD-10-CM

## 2025-08-18 PROCEDURE — 99214 OFFICE O/P EST MOD 30 MIN: CPT | Performed by: OTOLARYNGOLOGY

## 2025-09-03 PROBLEM — J34.2 DNS (DEVIATED NASAL SEPTUM): Status: ACTIVE | Noted: 2025-08-18

## 2025-09-03 PROBLEM — J34.3 HYPERTROPHY OF BOTH INFERIOR NASAL TURBINATES: Status: ACTIVE | Noted: 2025-08-18

## 2025-10-02 ENCOUNTER — APPOINTMENT (OUTPATIENT)
Dept: GASTROENTEROLOGY | Facility: CLINIC | Age: 50
End: 2025-10-02
Payer: COMMERCIAL

## 2025-11-07 ENCOUNTER — APPOINTMENT (OUTPATIENT)
Dept: OTOLARYNGOLOGY | Facility: CLINIC | Age: 50
End: 2025-11-07
Payer: COMMERCIAL

## 2025-11-14 ENCOUNTER — APPOINTMENT (OUTPATIENT)
Dept: OTOLARYNGOLOGY | Facility: CLINIC | Age: 50
End: 2025-11-14
Payer: COMMERCIAL

## 2026-03-09 ENCOUNTER — APPOINTMENT (OUTPATIENT)
Dept: PRIMARY CARE | Facility: CLINIC | Age: 51
End: 2026-03-09
Payer: COMMERCIAL